# Patient Record
Sex: FEMALE | Race: AMERICAN INDIAN OR ALASKA NATIVE | HISPANIC OR LATINO | URBAN - METROPOLITAN AREA
[De-identification: names, ages, dates, MRNs, and addresses within clinical notes are randomized per-mention and may not be internally consistent; named-entity substitution may affect disease eponyms.]

---

## 2017-05-12 ENCOUNTER — EMERGENCY (EMERGENCY)
Facility: HOSPITAL | Age: 42
LOS: 1 days | Discharge: HOME | End: 2017-05-12
Attending: EMERGENCY MEDICINE
Payer: SELF-PAY

## 2017-05-12 VITALS
OXYGEN SATURATION: 100 % | HEART RATE: 75 BPM | WEIGHT: 154.98 LBS | HEIGHT: 61 IN | TEMPERATURE: 98 F | RESPIRATION RATE: 16 BRPM | DIASTOLIC BLOOD PRESSURE: 52 MMHG | SYSTOLIC BLOOD PRESSURE: 106 MMHG
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
## 2017-05-12 DIAGNOSIS — N60.01 SOLITARY CYST OF RIGHT BREAST: Chronic | ICD-10-CM

## 2017-05-12 DIAGNOSIS — E03.9 HYPOTHYROIDISM, UNSPECIFIED: ICD-10-CM
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
## 2017-05-12 DIAGNOSIS — S33.5XXA SPRAIN OF LIGAMENTS OF LUMBAR SPINE, INITIAL ENCOUNTER: ICD-10-CM

## 2017-05-12 PROCEDURE — 99053 MED SERV 10PM-8AM 24 HR FAC: CPT

## 2017-05-12 PROCEDURE — 99283 EMERGENCY DEPT VISIT LOW MDM: CPT

## 2017-05-12 PROCEDURE — 99283 EMERGENCY DEPT VISIT LOW MDM: CPT | Mod: 25

## 2017-05-12 RX ORDER — IBUPROFEN 200 MG
600 TABLET ORAL ONCE
Qty: 0 | Refills: 0 | Status: COMPLETED | OUTPATIENT
Start: 2017-05-12 | End: 2017-05-12

## 2017-05-12 RX ORDER — IBUPROFEN 200 MG
1 TABLET ORAL
Qty: 30 | Refills: 0
Start: 2017-05-12 | End: 2017-05-22

## 2017-05-12 RX ORDER — OXYCODONE HYDROCHLORIDE 5 MG/1
1 TABLET ORAL
Qty: 12 | Refills: 0
Start: 2017-05-12 | End: 2017-05-15

## 2017-05-12 RX ADMIN — Medication 600 MILLIGRAM(S): at 02:28

## 2017-05-12 NOTE — ED PROVIDER NOTE - OBJECTIVE STATEMENT
41 y/o F pt w/ PMHx of Hypothyroid presents to ED c/o R lower back pain x1 month. Pt points to R SI joint as the location of her pain. Pt states that her symptoms onset while lifting weights at the gym 1 month ago; pt went to her PMD 2 weeks ago and was given a muscle relaxer to no relief. Pt reports that her pain radiates down her leg; pt's pain is worsened w/ movement. Pt denies dysuria, hematuria, abd pain, or any other complaints. NKDA.

## 2017-05-12 NOTE — ED ADULT NURSE NOTE - OBJECTIVE STATEMENT
Pt co lower back pain, went to her pmd and her physical therapy but the pain is still persistent. denies trouble urination. possibly injured it when she was at the gym. denies fever and chills.  pain during ambulationg

## 2017-05-12 NOTE — ED PROVIDER NOTE - CRANIAL NERVE AND PUPILLARY EXAM
central and peripheral vision intact/extra-ocular movements intact/cranial nerves 2-12 intact/corneal reflex intact/tongue is midline

## 2017-05-12 NOTE — ED PROVIDER NOTE - NS ED MD SCRIBE ATTENDING SCRIBE SECTIONS
PAST MEDICAL/SURGICAL/SOCIAL HISTORY/REVIEW OF SYSTEMS/PHYSICAL EXAM/VITAL SIGNS( Pullset)/HISTORY OF PRESENT ILLNESS/DISPOSITION/HIV

## 2020-09-28 ENCOUNTER — RESULT REVIEW (OUTPATIENT)
Age: 45
End: 2020-09-28

## 2021-10-04 ENCOUNTER — APPOINTMENT (OUTPATIENT)
Dept: CARDIOLOGY | Facility: CLINIC | Age: 46
End: 2021-10-04

## 2022-01-31 ENCOUNTER — RESULT REVIEW (OUTPATIENT)
Age: 47
End: 2022-01-31

## 2022-02-23 ENCOUNTER — RESULT REVIEW (OUTPATIENT)
Age: 47
End: 2022-02-23

## 2022-03-01 ENCOUNTER — RESULT REVIEW (OUTPATIENT)
Age: 47
End: 2022-03-01

## 2022-03-01 ENCOUNTER — OUTPATIENT (OUTPATIENT)
Dept: OUTPATIENT SERVICES | Facility: HOSPITAL | Age: 47
LOS: 1 days | End: 2022-03-01
Payer: MEDICAID

## 2022-03-01 DIAGNOSIS — N63.0 UNSPECIFIED LUMP IN UNSPECIFIED BREAST: ICD-10-CM

## 2022-03-01 DIAGNOSIS — N60.01 SOLITARY CYST OF RIGHT BREAST: Chronic | ICD-10-CM

## 2022-03-01 LAB — SURGICAL PATHOLOGY STUDY: SIGNIFICANT CHANGE UP

## 2022-03-01 PROCEDURE — 88321 CONSLTJ&REPRT SLD PREP ELSWR: CPT

## 2022-03-06 NOTE — ED ADULT TRIAGE NOTE - RESPIRATORY RATE (BREATHS/MIN)
Problem: Pain:  Goal: Pain level will decrease  Description: Pain level will decrease  3/6/2022 1027 by Valdemar Lopez RN  Outcome: Completed  3/6/2022 0516 by Jossie Duarte RN  Outcome: Ongoing  Goal: Control of acute pain  Description: Control of acute pain  3/6/2022 1027 by Valdemar Lopez RN  Outcome: Completed  3/6/2022 0516 by Jossie Duarte RN  Outcome: Ongoing  Goal: Control of chronic pain  Description: Control of chronic pain  3/6/2022 1027 by Valdemar Lopez RN  Outcome: Completed  3/6/2022 0516 by Jossie Duarte RN  Outcome: Ongoing 16

## 2022-03-16 ENCOUNTER — TRANSCRIPTION ENCOUNTER (OUTPATIENT)
Age: 47
End: 2022-03-16

## 2022-03-17 ENCOUNTER — TRANSCRIPTION ENCOUNTER (OUTPATIENT)
Age: 47
End: 2022-03-17

## 2022-03-17 ENCOUNTER — INPATIENT (INPATIENT)
Facility: HOSPITAL | Age: 47
LOS: 2 days | Discharge: HOME | DRG: 581 | End: 2022-03-20
Attending: SURGERY | Admitting: SURGERY
Payer: MEDICAID

## 2022-03-17 ENCOUNTER — RESULT REVIEW (OUTPATIENT)
Age: 47
End: 2022-03-17

## 2022-03-17 VITALS
TEMPERATURE: 98 F | HEIGHT: 61 IN | SYSTOLIC BLOOD PRESSURE: 106 MMHG | DIASTOLIC BLOOD PRESSURE: 72 MMHG | HEART RATE: 81 BPM | RESPIRATION RATE: 16 BRPM | WEIGHT: 161.82 LBS | OXYGEN SATURATION: 98 %

## 2022-03-17 DIAGNOSIS — Z86.79 PERSONAL HISTORY OF OTHER DISEASES OF THE CIRCULATORY SYSTEM: Chronic | ICD-10-CM

## 2022-03-17 DIAGNOSIS — N60.01 SOLITARY CYST OF RIGHT BREAST: Chronic | ICD-10-CM

## 2022-03-17 PROCEDURE — 88302 TISSUE EXAM BY PATHOLOGIST: CPT | Mod: 26

## 2022-03-17 PROCEDURE — 88307 TISSUE EXAM BY PATHOLOGIST: CPT | Mod: 26

## 2022-03-17 RX ORDER — ACETAMINOPHEN 500 MG
1000 TABLET ORAL ONCE
Refills: 0 | Status: COMPLETED | OUTPATIENT
Start: 2022-03-17 | End: 2022-03-17

## 2022-03-17 RX ORDER — OXYCODONE HYDROCHLORIDE 5 MG/1
5 TABLET ORAL EVERY 6 HOURS
Refills: 0 | Status: DISCONTINUED | OUTPATIENT
Start: 2022-03-17 | End: 2022-03-17

## 2022-03-17 RX ORDER — HYDROMORPHONE HYDROCHLORIDE 2 MG/ML
1 INJECTION INTRAMUSCULAR; INTRAVENOUS; SUBCUTANEOUS
Refills: 0 | Status: DISCONTINUED | OUTPATIENT
Start: 2022-03-17 | End: 2022-03-17

## 2022-03-17 RX ORDER — KETOROLAC TROMETHAMINE 30 MG/ML
15 SYRINGE (ML) INJECTION EVERY 6 HOURS
Refills: 0 | Status: DISCONTINUED | OUTPATIENT
Start: 2022-03-17 | End: 2022-03-17

## 2022-03-17 RX ORDER — ACETAMINOPHEN 500 MG
650 TABLET ORAL EVERY 6 HOURS
Refills: 0 | Status: DISCONTINUED | OUTPATIENT
Start: 2022-03-17 | End: 2022-03-17

## 2022-03-17 RX ORDER — CEFAZOLIN SODIUM 1 G
2000 VIAL (EA) INJECTION EVERY 8 HOURS
Refills: 0 | Status: DISCONTINUED | OUTPATIENT
Start: 2022-03-17 | End: 2022-03-18

## 2022-03-17 RX ORDER — OXYCODONE HYDROCHLORIDE 5 MG/1
5 TABLET ORAL EVERY 6 HOURS
Refills: 0 | Status: DISCONTINUED | OUTPATIENT
Start: 2022-03-17 | End: 2022-03-18

## 2022-03-17 RX ORDER — METOCLOPRAMIDE HCL 10 MG
10 TABLET ORAL ONCE
Refills: 0 | Status: COMPLETED | OUTPATIENT
Start: 2022-03-17 | End: 2022-03-17

## 2022-03-17 RX ORDER — ACETAMINOPHEN 500 MG
1000 TABLET ORAL ONCE
Refills: 0 | Status: COMPLETED | OUTPATIENT
Start: 2022-03-18 | End: 2022-03-18

## 2022-03-17 RX ORDER — SODIUM CHLORIDE 9 MG/ML
1000 INJECTION, SOLUTION INTRAVENOUS
Refills: 0 | Status: DISCONTINUED | OUTPATIENT
Start: 2022-03-17 | End: 2022-03-18

## 2022-03-17 RX ORDER — ATORVASTATIN CALCIUM 80 MG/1
10 TABLET, FILM COATED ORAL AT BEDTIME
Refills: 0 | Status: DISCONTINUED | OUTPATIENT
Start: 2022-03-17 | End: 2022-03-18

## 2022-03-17 RX ORDER — ONDANSETRON 8 MG/1
4 TABLET, FILM COATED ORAL EVERY 6 HOURS
Refills: 0 | Status: DISCONTINUED | OUTPATIENT
Start: 2022-03-17 | End: 2022-03-18

## 2022-03-17 RX ORDER — ACETAMINOPHEN 500 MG
1000 TABLET ORAL ONCE
Refills: 0 | Status: DISCONTINUED | OUTPATIENT
Start: 2022-03-18 | End: 2022-03-18

## 2022-03-17 RX ORDER — HYDROMORPHONE HYDROCHLORIDE 2 MG/ML
0.5 INJECTION INTRAMUSCULAR; INTRAVENOUS; SUBCUTANEOUS
Refills: 0 | Status: DISCONTINUED | OUTPATIENT
Start: 2022-03-17 | End: 2022-03-18

## 2022-03-17 RX ORDER — LEVOTHYROXINE SODIUM 125 MCG
75 TABLET ORAL DAILY
Refills: 0 | Status: DISCONTINUED | OUTPATIENT
Start: 2022-03-18 | End: 2022-03-18

## 2022-03-17 RX ORDER — BUPIVACAINE 13.3 MG/ML
20 INJECTION, SUSPENSION, LIPOSOMAL INFILTRATION ONCE
Refills: 0 | Status: DISCONTINUED | OUTPATIENT
Start: 2022-03-17 | End: 2022-03-18

## 2022-03-17 RX ADMIN — HYDROMORPHONE HYDROCHLORIDE 0.5 MILLIGRAM(S): 2 INJECTION INTRAMUSCULAR; INTRAVENOUS; SUBCUTANEOUS at 18:09

## 2022-03-17 RX ADMIN — OXYCODONE HYDROCHLORIDE 5 MILLIGRAM(S): 5 TABLET ORAL at 22:06

## 2022-03-17 RX ADMIN — Medication 400 MILLIGRAM(S): at 22:48

## 2022-03-17 RX ADMIN — HYDROMORPHONE HYDROCHLORIDE 0.5 MILLIGRAM(S): 2 INJECTION INTRAMUSCULAR; INTRAVENOUS; SUBCUTANEOUS at 13:17

## 2022-03-17 RX ADMIN — ONDANSETRON 4 MILLIGRAM(S): 8 TABLET, FILM COATED ORAL at 14:14

## 2022-03-17 RX ADMIN — Medication 400 MILLIGRAM(S): at 16:28

## 2022-03-17 RX ADMIN — HYDROMORPHONE HYDROCHLORIDE 0.5 MILLIGRAM(S): 2 INJECTION INTRAMUSCULAR; INTRAVENOUS; SUBCUTANEOUS at 13:32

## 2022-03-17 RX ADMIN — Medication 1000 MILLIGRAM(S): at 22:56

## 2022-03-17 RX ADMIN — HYDROMORPHONE HYDROCHLORIDE 0.5 MILLIGRAM(S): 2 INJECTION INTRAMUSCULAR; INTRAVENOUS; SUBCUTANEOUS at 16:29

## 2022-03-17 RX ADMIN — ATORVASTATIN CALCIUM 10 MILLIGRAM(S): 80 TABLET, FILM COATED ORAL at 21:06

## 2022-03-17 RX ADMIN — Medication 5 MILLIGRAM(S): at 22:48

## 2022-03-17 RX ADMIN — Medication 100 MILLIGRAM(S): at 16:28

## 2022-03-17 RX ADMIN — Medication 10 MILLIGRAM(S): at 18:13

## 2022-03-17 RX ADMIN — OXYCODONE HYDROCHLORIDE 5 MILLIGRAM(S): 5 TABLET ORAL at 21:06

## 2022-03-17 RX ADMIN — ONDANSETRON 4 MILLIGRAM(S): 8 TABLET, FILM COATED ORAL at 21:07

## 2022-03-17 NOTE — PACU DISCHARGE NOTE - COMMENTS
[place] given to QUENTIN Mccord of [address]. Bilateral chest dressing intact on surgical bra. BERNICE x 2. Handover given to QUENTIN Mccord of [address]. Bilateral chest dressing intact on surgical bra. BERNICE x 2 on self suction. No complaints of pain. Went to tele floor in stable condition.

## 2022-03-17 NOTE — PRE-OP CHECKLIST - PATIENT'S PERSONAL PROPERTY GIVEN TO
2 bags on unit, valuables with security/on unit/security/safe 1 beg on unit, valuables with security/on unit/security/safe

## 2022-03-17 NOTE — PATIENT PROFILE ADULT - FALL HARM RISK - UNIVERSAL INTERVENTIONS
Bed in lowest position, wheels locked, appropriate side rails in place/Call bell, personal items and telephone in reach/Instruct patient to call for assistance before getting out of bed or chair/Non-slip footwear when patient is out of bed/[place] to call system/Physically safe environment - no spills, clutter or unnecessary equipment/Purposeful Proactive Rounding/Room/bathroom lighting operational, light cord in reach

## 2022-03-18 ENCOUNTER — RESULT REVIEW (OUTPATIENT)
Age: 47
End: 2022-03-18

## 2022-03-18 ENCOUNTER — TRANSCRIPTION ENCOUNTER (OUTPATIENT)
Age: 47
End: 2022-03-18

## 2022-03-18 LAB
ANION GAP SERPL CALC-SCNC: 8 MMOL/L — SIGNIFICANT CHANGE UP (ref 5–17)
BUN SERPL-MCNC: 12 MG/DL — SIGNIFICANT CHANGE UP (ref 7–23)
CALCIUM SERPL-MCNC: 8.5 MG/DL — SIGNIFICANT CHANGE UP (ref 8.4–10.5)
CHLORIDE SERPL-SCNC: 106 MMOL/L — SIGNIFICANT CHANGE UP (ref 96–108)
CO2 SERPL-SCNC: 24 MMOL/L — SIGNIFICANT CHANGE UP (ref 22–31)
CREAT SERPL-MCNC: 0.57 MG/DL — SIGNIFICANT CHANGE UP (ref 0.5–1.3)
EGFR: 114 ML/MIN/1.73M2 — SIGNIFICANT CHANGE UP
GLUCOSE SERPL-MCNC: 104 MG/DL — HIGH (ref 70–99)
HCT VFR BLD CALC: 35.1 % — SIGNIFICANT CHANGE UP (ref 34.5–45)
HGB BLD-MCNC: 11.1 G/DL — LOW (ref 11.5–15.5)
MAGNESIUM SERPL-MCNC: 2.1 MG/DL — SIGNIFICANT CHANGE UP (ref 1.6–2.6)
MCHC RBC-ENTMCNC: 27.4 PG — SIGNIFICANT CHANGE UP (ref 27–34)
MCHC RBC-ENTMCNC: 31.6 GM/DL — LOW (ref 32–36)
MCV RBC AUTO: 86.7 FL — SIGNIFICANT CHANGE UP (ref 80–100)
NRBC # BLD: 0 /100 WBCS — SIGNIFICANT CHANGE UP (ref 0–0)
PHOSPHATE SERPL-MCNC: 3.6 MG/DL — SIGNIFICANT CHANGE UP (ref 2.5–4.5)
PLATELET # BLD AUTO: 201 K/UL — SIGNIFICANT CHANGE UP (ref 150–400)
POTASSIUM SERPL-MCNC: 3.8 MMOL/L — SIGNIFICANT CHANGE UP (ref 3.5–5.3)
POTASSIUM SERPL-SCNC: 3.8 MMOL/L — SIGNIFICANT CHANGE UP (ref 3.5–5.3)
RBC # BLD: 4.05 M/UL — SIGNIFICANT CHANGE UP (ref 3.8–5.2)
RBC # FLD: 15.1 % — HIGH (ref 10.3–14.5)
SODIUM SERPL-SCNC: 138 MMOL/L — SIGNIFICANT CHANGE UP (ref 135–145)
WBC # BLD: 10.68 K/UL — HIGH (ref 3.8–10.5)
WBC # FLD AUTO: 10.68 K/UL — HIGH (ref 3.8–10.5)

## 2022-03-18 PROCEDURE — 88302 TISSUE EXAM BY PATHOLOGIST: CPT | Mod: 26

## 2022-03-18 DEVICE — IMPLANTABLE DEVICE: Type: IMPLANTABLE DEVICE | Status: FUNCTIONAL

## 2022-03-18 RX ORDER — ONDANSETRON 8 MG/1
4 TABLET, FILM COATED ORAL ONCE
Refills: 0 | Status: COMPLETED | OUTPATIENT
Start: 2022-03-18 | End: 2022-03-18

## 2022-03-18 RX ORDER — CEFAZOLIN SODIUM 1 G
2000 VIAL (EA) INJECTION ONCE
Refills: 0 | Status: COMPLETED | OUTPATIENT
Start: 2022-03-18 | End: 2022-03-18

## 2022-03-18 RX ORDER — ONDANSETRON 8 MG/1
4 TABLET, FILM COATED ORAL EVERY 6 HOURS
Refills: 0 | Status: DISCONTINUED | OUTPATIENT
Start: 2022-03-18 | End: 2022-03-20

## 2022-03-18 RX ORDER — BENZOCAINE AND MENTHOL 5; 1 G/100ML; G/100ML
1 LIQUID ORAL EVERY 4 HOURS
Refills: 0 | Status: DISCONTINUED | OUTPATIENT
Start: 2022-03-18 | End: 2022-03-20

## 2022-03-18 RX ORDER — CEFAZOLIN SODIUM 1 G
VIAL (EA) INJECTION
Refills: 0 | Status: COMPLETED | OUTPATIENT
Start: 2022-03-18 | End: 2022-03-19

## 2022-03-18 RX ORDER — HYDROMORPHONE HYDROCHLORIDE 2 MG/ML
0.5 INJECTION INTRAMUSCULAR; INTRAVENOUS; SUBCUTANEOUS EVERY 4 HOURS
Refills: 0 | Status: DISCONTINUED | OUTPATIENT
Start: 2022-03-18 | End: 2022-03-19

## 2022-03-18 RX ORDER — POTASSIUM CHLORIDE 20 MEQ
10 PACKET (EA) ORAL ONCE
Refills: 0 | Status: COMPLETED | OUTPATIENT
Start: 2022-03-18 | End: 2022-03-18

## 2022-03-18 RX ORDER — OXYCODONE HYDROCHLORIDE 5 MG/1
10 TABLET ORAL EVERY 4 HOURS
Refills: 0 | Status: DISCONTINUED | OUTPATIENT
Start: 2022-03-18 | End: 2022-03-19

## 2022-03-18 RX ORDER — HYDROMORPHONE HYDROCHLORIDE 2 MG/ML
0.25 INJECTION INTRAMUSCULAR; INTRAVENOUS; SUBCUTANEOUS ONCE
Refills: 0 | Status: DISCONTINUED | OUTPATIENT
Start: 2022-03-18 | End: 2022-03-18

## 2022-03-18 RX ORDER — SODIUM CHLORIDE 9 MG/ML
1000 INJECTION, SOLUTION INTRAVENOUS
Refills: 0 | Status: DISCONTINUED | OUTPATIENT
Start: 2022-03-18 | End: 2022-03-19

## 2022-03-18 RX ORDER — ATORVASTATIN CALCIUM 80 MG/1
10 TABLET, FILM COATED ORAL AT BEDTIME
Refills: 0 | Status: DISCONTINUED | OUTPATIENT
Start: 2022-03-18 | End: 2022-03-20

## 2022-03-18 RX ORDER — HYDROMORPHONE HYDROCHLORIDE 2 MG/ML
0.5 INJECTION INTRAMUSCULAR; INTRAVENOUS; SUBCUTANEOUS ONCE
Refills: 0 | Status: DISCONTINUED | OUTPATIENT
Start: 2022-03-18 | End: 2022-03-18

## 2022-03-18 RX ORDER — LEVOTHYROXINE SODIUM 125 MCG
75 TABLET ORAL DAILY
Refills: 0 | Status: DISCONTINUED | OUTPATIENT
Start: 2022-03-18 | End: 2022-03-20

## 2022-03-18 RX ORDER — CEFAZOLIN SODIUM 1 G
2000 VIAL (EA) INJECTION EVERY 8 HOURS
Refills: 0 | Status: COMPLETED | OUTPATIENT
Start: 2022-03-18 | End: 2022-03-19

## 2022-03-18 RX ORDER — OXYCODONE HYDROCHLORIDE 5 MG/1
5 TABLET ORAL EVERY 4 HOURS
Refills: 0 | Status: DISCONTINUED | OUTPATIENT
Start: 2022-03-18 | End: 2022-03-19

## 2022-03-18 RX ORDER — LIDOCAINE 4 G/100G
1 CREAM TOPICAL ONCE
Refills: 0 | Status: COMPLETED | OUTPATIENT
Start: 2022-03-18 | End: 2022-03-18

## 2022-03-18 RX ORDER — ACETAMINOPHEN 500 MG
650 TABLET ORAL EVERY 6 HOURS
Refills: 0 | Status: DISCONTINUED | OUTPATIENT
Start: 2022-03-18 | End: 2022-03-20

## 2022-03-18 RX ADMIN — ONDANSETRON 4 MILLIGRAM(S): 8 TABLET, FILM COATED ORAL at 16:55

## 2022-03-18 RX ADMIN — HYDROMORPHONE HYDROCHLORIDE 0.5 MILLIGRAM(S): 2 INJECTION INTRAMUSCULAR; INTRAVENOUS; SUBCUTANEOUS at 00:46

## 2022-03-18 RX ADMIN — LIDOCAINE 1 PATCH: 4 CREAM TOPICAL at 07:31

## 2022-03-18 RX ADMIN — LIDOCAINE 1 PATCH: 4 CREAM TOPICAL at 00:45

## 2022-03-18 RX ADMIN — Medication 1000 MILLIGRAM(S): at 12:00

## 2022-03-18 RX ADMIN — Medication 400 MILLIGRAM(S): at 11:06

## 2022-03-18 RX ADMIN — SODIUM CHLORIDE 90 MILLILITER(S): 9 INJECTION, SOLUTION INTRAVENOUS at 19:12

## 2022-03-18 RX ADMIN — Medication 1000 MILLIGRAM(S): at 05:50

## 2022-03-18 RX ADMIN — HYDROMORPHONE HYDROCHLORIDE 0.25 MILLIGRAM(S): 2 INJECTION INTRAMUSCULAR; INTRAVENOUS; SUBCUTANEOUS at 09:00

## 2022-03-18 RX ADMIN — HYDROMORPHONE HYDROCHLORIDE 0.25 MILLIGRAM(S): 2 INJECTION INTRAMUSCULAR; INTRAVENOUS; SUBCUTANEOUS at 09:04

## 2022-03-18 RX ADMIN — ONDANSETRON 4 MILLIGRAM(S): 8 TABLET, FILM COATED ORAL at 14:39

## 2022-03-18 RX ADMIN — OXYCODONE HYDROCHLORIDE 5 MILLIGRAM(S): 5 TABLET ORAL at 05:50

## 2022-03-18 RX ADMIN — HYDROMORPHONE HYDROCHLORIDE 0.5 MILLIGRAM(S): 2 INJECTION INTRAMUSCULAR; INTRAVENOUS; SUBCUTANEOUS at 18:46

## 2022-03-18 RX ADMIN — Medication 100 MILLIGRAM(S): at 00:18

## 2022-03-18 RX ADMIN — LIDOCAINE 1 PATCH: 4 CREAM TOPICAL at 16:07

## 2022-03-18 RX ADMIN — HYDROMORPHONE HYDROCHLORIDE 0.5 MILLIGRAM(S): 2 INJECTION INTRAMUSCULAR; INTRAVENOUS; SUBCUTANEOUS at 16:52

## 2022-03-18 RX ADMIN — SODIUM CHLORIDE 100 MILLILITER(S): 9 INJECTION, SOLUTION INTRAVENOUS at 07:33

## 2022-03-18 RX ADMIN — Medication 100 MILLIEQUIVALENT(S): at 11:06

## 2022-03-18 RX ADMIN — ATORVASTATIN CALCIUM 10 MILLIGRAM(S): 80 TABLET, FILM COATED ORAL at 22:29

## 2022-03-18 RX ADMIN — Medication 100 MILLIGRAM(S): at 22:29

## 2022-03-18 RX ADMIN — HYDROMORPHONE HYDROCHLORIDE 0.5 MILLIGRAM(S): 2 INJECTION INTRAMUSCULAR; INTRAVENOUS; SUBCUTANEOUS at 01:00

## 2022-03-18 RX ADMIN — Medication 100 MILLIGRAM(S): at 09:03

## 2022-03-18 RX ADMIN — Medication 75 MICROGRAM(S): at 05:50

## 2022-03-18 RX ADMIN — Medication 400 MILLIGRAM(S): at 05:49

## 2022-03-18 RX ADMIN — OXYCODONE HYDROCHLORIDE 5 MILLIGRAM(S): 5 TABLET ORAL at 06:50

## 2022-03-18 NOTE — ASU DISCHARGE PLAN (ADULT/PEDIATRIC) - FREQUENT HAND WASHING PREVENTS THE SPREAD OF INFECTION.
Hi all,      Please call patient's daughter Geneva to coordinate a radiology and virtual or in clinic follow up with Dr. Epi Aparicio to discuss the results. She states that the patient has a lab appointment on 1/12/2021. She would like to know if the radiology appointment could also be on that day as well. If you have any scheduling questions -please reach out to Loyda Lares MA-clinic assistant for Dr. Aparicio.       Thanks, Macy Johnson MA    Statement Selected

## 2022-03-18 NOTE — ASU DISCHARGE PLAN (ADULT/PEDIATRIC) - NO HEAVY LIFTING DURATION
Pleasse avoid lifting weights over 5-10 lbs, strenuous activities or heavy exercises for 4-6 weeks or until cleared by your plastic surgeon Please avoid lifting weights over 5-10 lbs, strenuous activities or heavy exercises for 4-6 weeks or until cleared by your plastic surgeon

## 2022-03-18 NOTE — BRIEF OPERATIVE NOTE - OPERATION/FINDINGS
R breast explored via previous mastectomy incision, alloderm opened, and silicone implant removed. No hematoma appreciated, no raw surface oozing or bleeding noted. Cavity copiously irrigated w NS, followed by triple antibiotic saline. Hemostasis achieved, alloderm closed in running fashion w 2-0 PDS x2, layered closure of dermis + epidermis w running 2-0 quill sutures. BERNICE drain replaced.
After general anesthesia, patient was prep'ed and draped under sterile fashion. 10cm circum-areolar elliptical incisions were made over left breast. Skin flaps were made by dissecting subcutaneous tissues. Breast tissue was dissected and excised including nipple, using electrocautery and blunt dissection with margins to clavicle superiorly, rectus sheath inferiorly, lattisimus dorsi laterally, sternum medially, and pectoralis major posteriorly. Hemostasis was verified after multiple times of irrigation. Same procedure was performed for mastectomy of the right side. Left sentinel lymph nodes were also dissected and sent for pathology. Surgery was proceeded by Plastic Surgery team for bilateral reconstruction with Alloderm and tissue expanders and placement of BERNICE drains (details in separate note by Plastic Surgery).

## 2022-03-18 NOTE — ASU DISCHARGE PLAN (ADULT/PEDIATRIC) - PROVIDER TOKENS
PROVIDER:[TOKEN:[[id number]],FOLLOWUP:[1 week]],PROVIDER:[TOKEN:[[id number]],SCHEDULEDAPPT:[03/21/2022]]

## 2022-03-18 NOTE — ASU DISCHARGE PLAN (ADULT/PEDIATRIC) - NS MD DC FALL RISK RISK
For information on Fall & Injury Prevention, visit: [email or web address] OR  [email or web address] OR  https://www.cdc.gov/steadi/patient.html

## 2022-03-18 NOTE — ASU DISCHARGE PLAN (ADULT/PEDIATRIC) - CARE PROVIDER_API CALL
Martin Onofre (DO)  Surgery  [address], [place] [address]  Phone: [phone]  Fax: [phone]  Follow Up Time: 1 week    Antoinette Arce)  Plastic Surgery  [address], [address]  [place] [address]  Phone: [phone]  Fax: [phone]  Scheduled Appointment: 03/21/2022

## 2022-03-18 NOTE — ASU DISCHARGE PLAN (ADULT/PEDIATRIC) - PROCEDURE
Bilateral mastectomy with left sentinel lymph node excision with bilateral reconstruction with tissue expanders

## 2022-03-18 NOTE — H&P PST ADULT - HISTORY OF PRESENT ILLNESS
46yo F PMH HLD & hypothyroidism, unclear cardiac hx (s/p stress test wnl), and L breast IDC and PSH C/S(x3) presents for b/l simple mastectomy and L SLND.

## 2022-03-18 NOTE — ASU DISCHARGE PLAN (ADULT/PEDIATRIC) - ASU DC SPECIAL INSTRUCTIONSFT
Please follow up with Dr Onofre or Dr. Santiago in one week. Please follow up with Dr. Arce on Monday 3/21/2022; you may call their offices to make an appointment at your earliest convenience.    Please take 2 tablets of extra-strength Tylenol (500mg) every 8 hours plus 2 tablets of Advil (200mg) every 6 hours, for pain control. Do NOT exceed 4,000mg of Tylenol per 24 hours. Do NOT take Advil on an empty stomach.    You have been prescribed oral antibiotic. Please be sure to take it as directed and complete the entire course of treatment.    Please hold aspirin for 2 weeks after your surgery and restart taking it 3/31. Please restart taking your other home medications including Synthroid, atorvastatin, and Myrbetriq today (3/18).      You are being discharged with 2 BERNICE drains. These will be re-examined and may be removed at time of your follow up visit with Dr. Arce. Please follow the below instructions for drain care:  *Please look at the site every day for signs of infection (increased redness or pain, swelling, odor, yellow or bloody discharge, warm to touch, fever).  *Maintain suction of the bulb.  *Note color, consistency, and amount of fluid in the drain. Call the doctor, nurse practitioner, or VNA nurse if the amount increases significantly or changes in character.  *Be sure to empty the drain frequently. Record the output, if instructed to do so.  *You may shower; wash the area gently with warm, soapy water.  *Keep the insertion site clean and dry otherwise.  *Avoid swimming, baths, hot tubs; do not submerge yourself in water.  *Make sure to keep the drain attached securely to your body to prevent pulling or dislocation.    General Discharge Instructions:  Please resume all regular home medications unless specifically advised not to take a particular medication. Also, please take any new medications as prescribed.  Please get plenty of rest, continue to ambulate several times per day, and drink adequate amounts of fluids. Avoid lifting weights greater than 5-10 lbs until you follow-up with your surgeon, who will instruct you further regarding activity restrictions.  Avoid driving or operating heavy machinery while taking pain medications.  Please follow-up with your surgeon and Primary Care Provider (PCP) as advised.  Incision Care:  *Please call your doctor or nurse practitioner if you have increased pain, swelling, redness, or drainage from the incision site.  *Avoid swimming and baths until your follow-up appointment.  *You may remove dressing Friday 3/18 (but do not remove Steri-strips and keep those in place until follow up visit) and start taking showers.   Please keep using bras until follow up visit. Please follow up with Dr Onofre or Dr. aSntiago in one week. Please follow up with Dr. Arce on Monday 3/21/2022; you may call their offices to make an appointment at your earliest convenience.    Please take 2 tablets of extra-strength Tylenol (500mg) every 8 hours plus 2 tablets of Advil (200mg) every 6 hours, for pain control. Do NOT exceed 4,000mg of Tylenol per 24 hours. Do NOT take Advil on an empty stomach.    You have been prescribed oral antibiotic. Please be sure to take it as directed and complete the entire course of treatment.    Please hold aspirin for 2 weeks after your surgery and restart taking it 3/31. Please restart taking your other home medications including Synthroid, atorvastatin, and Myrbetriq today (3/18).      You are being discharged with 2 BERNICE drains. These will be re-examined and may be removed at time of your follow up visit with Dr. Arce. Please follow the below instructions for drain care:  *Please look at the site every day for signs of infection (increased redness or pain, swelling, odor, yellow or bloody discharge, warm to touch, fever).  *Maintain suction of the bulb.  *Note color, consistency, and amount of fluid in the drain. Call the doctor, nurse practitioner, or VNA nurse if the amount increases significantly or changes in character.  *Be sure to empty the drain frequently. Record the daily output (in milliliters), as instructed.  *You may shower; wash the area gently with warm, soapy water.  *Keep the insertion site clean and dry otherwise.  *Avoid swimming, baths, hot tubs; do not submerge yourself in water.  *Make sure to keep the drain attached securely to your body to prevent pulling or dislocation.    General Discharge Instructions:  Please resume all regular home medications unless specifically advised not to take a particular medication. Also, please take any new medications as prescribed.  Please get plenty of rest, continue to ambulate several times per day, and drink adequate amounts of fluids. Avoid lifting weights greater than 5-10 lbs until you follow-up with your surgeon, who will instruct you further regarding activity restrictions.  Avoid driving or operating heavy machinery while taking pain medications.  Please follow-up with your surgeon and Primary Care Provider (PCP) as advised.  Incision Care:  *Please call your doctor or nurse practitioner if you have increased pain, swelling, redness, or drainage from the incision site.  *Avoid swimming and baths until your follow-up appointment.  *You may remove dressing Friday 3/18 (but do not remove Steri-strips and keep those in place until follow up visit) and start taking showers.   Please keep using bras until follow up visit. Please follow up with Dr Onofre or Dr. Santiago in one week. Please follow up with Dr. Arce on Monday 3/21/2022; you may call their offices to make an appointment at your earliest convenience.    Please take 2 tablets of extra-strength Tylenol (500mg) every 8 hours plus 2 tablets of Advil (200mg) every 6 hours, for pain control. Do NOT exceed 4,000mg of Tylenol per 24 hours. Do NOT take Advil on an empty stomach.    You have been prescribed oral antibiotic. Please be sure to take it as directed and complete the entire course of treatment.    Please hold aspirin for 2 weeks after your surgery and restart taking it 3/31. Please restart taking your other home medications including Synthroid, atorvastatin, and Myrbetriq today (3/20).      You are being discharged with 2 BERNICE drains. These will be re-examined and may be removed at time of your follow up visit with Dr. Arce. Please follow the below instructions for drain care:  *Please look at the site every day for signs of infection (increased redness or pain, swelling, odor, yellow or bloody discharge, warm to touch, fever).  *Maintain suction of the bulb.  *Note color, consistency, and amount of fluid in the drain. Call the doctor, nurse practitioner, or VNA nurse if the amount increases significantly or changes in character.  *Be sure to empty the drain frequently. Record the daily output (in milliliters), as instructed.  *You may shower; wash the area gently with warm, soapy water.  *Keep the insertion site clean and dry otherwise.  *Avoid swimming, baths, hot tubs; do not submerge yourself in water.  *Make sure to keep the drain attached securely to your body to prevent pulling or dislocation.    General Discharge Instructions:  Please resume all regular home medications unless specifically advised not to take a particular medication. Also, please take any new medications as prescribed.  Please get plenty of rest, continue to ambulate several times per day, and drink adequate amounts of fluids. Avoid lifting weights greater than 5-10 lbs until you follow-up with your surgeon, who will instruct you further regarding activity restrictions.  Avoid driving or operating heavy machinery while taking pain medications.  Please follow-up with your surgeon and Primary Care Provider (PCP) as advised.  Incision Care:  *Please call your doctor or nurse practitioner if you have increased pain, swelling, redness, or drainage from the incision site.  *Avoid swimming and baths until your follow-up appointment.  *You may remove dressing Friday 3/18 (but do not remove Steri-strips and keep those in place until follow up visit) and start taking showers.   Please keep using bras until follow up visit.

## 2022-03-18 NOTE — BRIEF OPERATIVE NOTE - NSICDXBRIEFPREOP_GEN_ALL_CORE_FT
PRE-OP DIAGNOSIS:  Invasive ductal carcinoma of left breast 17-Mar-2022 14:52:58  Eliud Sharma  
PRE-OP DIAGNOSIS:  Hematoma of right breast 18-Mar-2022 14:29:26  Mook Mullins

## 2022-03-18 NOTE — H&P PST ADULT - ASSESSMENT
48yo F PMH HLD & hypothyroidism, unclear cardia hx, and L breast IDC and PSH C/S(x3) presents for b/l simple mastectomy and L SLND s/p RTOR for right breast exploration, no bleeding or hematoma noted, right breast irrigated and new implant placed (3/18)    - Regular  - IVF (LR@100cc/hr)  - IVAB (2gr Ancef q8h x1day) to c/w PO (Keflex 500mg q6h)   - Pain control standing (Tylenol) and prn (oxycodone)  - Nausea control prn  - SCDs  - OOBA/IS  - AM labs  - JPs (x2)

## 2022-03-18 NOTE — BRIEF OPERATIVE NOTE - NSICDXBRIEFPOSTOP_GEN_ALL_CORE_FT
POST-OP DIAGNOSIS:  Invasive ductal carcinoma of left breast 17-Mar-2022 14:53:38  Eliud Sharma  
POST-OP DIAGNOSIS:  Invasive ductal carcinoma of left breast 17-Mar-2022 14:53:38  Eliud Sharma

## 2022-03-18 NOTE — PRE-OP CHECKLIST - AS BP NONINV SITE
----- Message from Jailyn Perales sent at 2022  8:39 AM CDT -----  Contact: mom Manasa Quan   Mom would jag a call back with questions about the formula     
Spoke with mom, she states that she went to the [facility] office today and the paperwork is filled out incorrectly. Pt needs the concentrate and not the powder form. Mom will drop form off to the office for completion.   
left upper arm
right upper arm

## 2022-03-18 NOTE — BRIEF OPERATIVE NOTE - SPECIMENS
R breast implant
Left and right breast tissues, left sentinel lymph node, left and right mastectomy flaps

## 2022-03-18 NOTE — BRIEF OPERATIVE NOTE - NSICDXBRIEFPROCEDURE_GEN_ALL_CORE_FT
PROCEDURES:  Exploration of right breast 18-Mar-2022 14:29:10  Mook Mullins  
PROCEDURES:  Bilateral skin sparing mastectomy with unilateral sentinel lymph node biopsy and axillary lymphadenectomy 17-Mar-2022 14:50:44  Eliud Sharma  Bilateral mastectomy with insertion of tissue expander on both sides 17-Mar-2022 14:52:26  Eliud Sharma

## 2022-03-18 NOTE — PRE-OP CHECKLIST - SELECT TESTS ORDERED
urine c&s; US/BMP/CBC/PT/PTT/INR/Urinalysis/EKG/COVID-19
BMP/PT/PTT/INR/Type and Cross/Type and Screen/Urinalysis/HCG/EKG/CXR/COVID-19

## 2022-03-18 NOTE — ASU DISCHARGE PLAN (ADULT/PEDIATRIC) - MEDICATION INSTRUCTIONS
Please take 2 tablets of extra-strength Tylenol (500mg) every 8 hours plus 2 tablets of Advil (200mg) every 6 hours, for pain control. Do NOT exceed 4,000mg of Tylenol per 24 hours. Do NOT take Advil on an empty stomach.

## 2022-03-19 LAB
ANION GAP SERPL CALC-SCNC: 12 MMOL/L — SIGNIFICANT CHANGE UP (ref 5–17)
BUN SERPL-MCNC: 15 MG/DL — SIGNIFICANT CHANGE UP (ref 7–23)
CALCIUM SERPL-MCNC: 8.2 MG/DL — LOW (ref 8.4–10.5)
CHLORIDE SERPL-SCNC: 104 MMOL/L — SIGNIFICANT CHANGE UP (ref 96–108)
CO2 SERPL-SCNC: 23 MMOL/L — SIGNIFICANT CHANGE UP (ref 22–31)
CREAT SERPL-MCNC: 0.67 MG/DL — SIGNIFICANT CHANGE UP (ref 0.5–1.3)
EGFR: 110 ML/MIN/1.73M2 — SIGNIFICANT CHANGE UP
GLUCOSE SERPL-MCNC: 97 MG/DL — SIGNIFICANT CHANGE UP (ref 70–99)
HCT VFR BLD CALC: 34.3 % — LOW (ref 34.5–45)
HGB BLD-MCNC: 11 G/DL — LOW (ref 11.5–15.5)
MAGNESIUM SERPL-MCNC: 1.8 MG/DL — SIGNIFICANT CHANGE UP (ref 1.6–2.6)
MCHC RBC-ENTMCNC: 28.2 PG — SIGNIFICANT CHANGE UP (ref 27–34)
MCHC RBC-ENTMCNC: 32.1 GM/DL — SIGNIFICANT CHANGE UP (ref 32–36)
MCV RBC AUTO: 87.9 FL — SIGNIFICANT CHANGE UP (ref 80–100)
NRBC # BLD: 0 /100 WBCS — SIGNIFICANT CHANGE UP (ref 0–0)
PHOSPHATE SERPL-MCNC: 3 MG/DL — SIGNIFICANT CHANGE UP (ref 2.5–4.5)
PLATELET # BLD AUTO: 186 K/UL — SIGNIFICANT CHANGE UP (ref 150–400)
POTASSIUM SERPL-MCNC: 3.9 MMOL/L — SIGNIFICANT CHANGE UP (ref 3.5–5.3)
POTASSIUM SERPL-SCNC: 3.9 MMOL/L — SIGNIFICANT CHANGE UP (ref 3.5–5.3)
RBC # BLD: 3.9 M/UL — SIGNIFICANT CHANGE UP (ref 3.8–5.2)
RBC # FLD: 15.5 % — HIGH (ref 10.3–14.5)
SODIUM SERPL-SCNC: 139 MMOL/L — SIGNIFICANT CHANGE UP (ref 135–145)
WBC # BLD: 10.59 K/UL — HIGH (ref 3.8–10.5)
WBC # FLD AUTO: 10.59 K/UL — HIGH (ref 3.8–10.5)

## 2022-03-19 RX ORDER — TRAMADOL HYDROCHLORIDE 50 MG/1
50 TABLET ORAL EVERY 6 HOURS
Refills: 0 | Status: DISCONTINUED | OUTPATIENT
Start: 2022-03-19 | End: 2022-03-20

## 2022-03-19 RX ORDER — CEPHALEXIN 500 MG
500 CAPSULE ORAL EVERY 6 HOURS
Refills: 0 | Status: DISCONTINUED | OUTPATIENT
Start: 2022-03-19 | End: 2022-03-20

## 2022-03-19 RX ORDER — IBUPROFEN 200 MG
400 TABLET ORAL EVERY 8 HOURS
Refills: 0 | Status: DISCONTINUED | OUTPATIENT
Start: 2022-03-19 | End: 2022-03-20

## 2022-03-19 RX ORDER — MAGNESIUM SULFATE 500 MG/ML
1 VIAL (ML) INJECTION ONCE
Refills: 0 | Status: COMPLETED | OUTPATIENT
Start: 2022-03-19 | End: 2022-03-19

## 2022-03-19 RX ADMIN — Medication 650 MILLIGRAM(S): at 05:01

## 2022-03-19 RX ADMIN — Medication 100 MILLIGRAM(S): at 14:03

## 2022-03-19 RX ADMIN — OXYCODONE HYDROCHLORIDE 10 MILLIGRAM(S): 5 TABLET ORAL at 06:00

## 2022-03-19 RX ADMIN — Medication 400 MILLIGRAM(S): at 22:46

## 2022-03-19 RX ADMIN — BENZOCAINE AND MENTHOL 1 LOZENGE: 5; 1 LIQUID ORAL at 05:06

## 2022-03-19 RX ADMIN — Medication 650 MILLIGRAM(S): at 14:02

## 2022-03-19 RX ADMIN — Medication 75 MICROGRAM(S): at 05:02

## 2022-03-19 RX ADMIN — ONDANSETRON 4 MILLIGRAM(S): 8 TABLET, FILM COATED ORAL at 06:35

## 2022-03-19 RX ADMIN — Medication 650 MILLIGRAM(S): at 01:10

## 2022-03-19 RX ADMIN — BENZOCAINE AND MENTHOL 1 LOZENGE: 5; 1 LIQUID ORAL at 01:07

## 2022-03-19 RX ADMIN — OXYCODONE HYDROCHLORIDE 5 MILLIGRAM(S): 5 TABLET ORAL at 18:53

## 2022-03-19 RX ADMIN — OXYCODONE HYDROCHLORIDE 10 MILLIGRAM(S): 5 TABLET ORAL at 11:00

## 2022-03-19 RX ADMIN — Medication 400 MILLIGRAM(S): at 21:51

## 2022-03-19 RX ADMIN — Medication 650 MILLIGRAM(S): at 06:00

## 2022-03-19 RX ADMIN — Medication 100 GRAM(S): at 10:13

## 2022-03-19 RX ADMIN — OXYCODONE HYDROCHLORIDE 10 MILLIGRAM(S): 5 TABLET ORAL at 01:10

## 2022-03-19 RX ADMIN — Medication 5 MILLIGRAM(S): at 05:05

## 2022-03-19 RX ADMIN — ONDANSETRON 4 MILLIGRAM(S): 8 TABLET, FILM COATED ORAL at 14:43

## 2022-03-19 RX ADMIN — Medication 650 MILLIGRAM(S): at 19:12

## 2022-03-19 RX ADMIN — ATORVASTATIN CALCIUM 10 MILLIGRAM(S): 80 TABLET, FILM COATED ORAL at 21:51

## 2022-03-19 RX ADMIN — Medication 650 MILLIGRAM(S): at 23:45

## 2022-03-19 RX ADMIN — Medication 100 MILLIGRAM(S): at 05:02

## 2022-03-19 RX ADMIN — Medication 650 MILLIGRAM(S): at 00:16

## 2022-03-19 RX ADMIN — OXYCODONE HYDROCHLORIDE 5 MILLIGRAM(S): 5 TABLET ORAL at 17:19

## 2022-03-19 RX ADMIN — OXYCODONE HYDROCHLORIDE 10 MILLIGRAM(S): 5 TABLET ORAL at 10:29

## 2022-03-19 RX ADMIN — OXYCODONE HYDROCHLORIDE 10 MILLIGRAM(S): 5 TABLET ORAL at 05:01

## 2022-03-19 RX ADMIN — Medication 650 MILLIGRAM(S): at 18:46

## 2022-03-19 RX ADMIN — Medication 500 MILLIGRAM(S): at 23:45

## 2022-03-19 RX ADMIN — Medication 650 MILLIGRAM(S): at 14:20

## 2022-03-19 RX ADMIN — OXYCODONE HYDROCHLORIDE 10 MILLIGRAM(S): 5 TABLET ORAL at 00:16

## 2022-03-19 NOTE — PHYSICAL THERAPY INITIAL EVALUATION ADULT - GAIT DEVIATIONS NOTED, PT EVAL
fairly steady gait, no LOB/knee buckling noted; *increased time required with turning/decreased neville/decreased velocity of limb motion/decreased step length/decreased weight-shifting ability

## 2022-03-19 NOTE — PHYSICAL THERAPY INITIAL EVALUATION ADULT - DISCHARGE DISPOSITION, PT EVAL
Home, no PT needs **pending progress (family assist available 24/7 as needed as per daughter Lois; educated daughter on Outpatient PT follow-up once medically cleared by surgeon) Home, no PT needs **pending progress (family assist available 24/7 as needed as per daughter Lois; educated daughter on Outpatient PT follow-up once patient is medically cleared by surgeon)

## 2022-03-19 NOTE — PHYSICAL THERAPY INITIAL EVALUATION ADULT - PHYSICAL ASSIST/NONPHYSICAL ASSIST: SIT/STAND, REHAB EVAL
slightly unsteady however no LOB/knee buckling noted/verbal cues/nonverbal cues (demo/gestures)/1 person assist

## 2022-03-19 NOTE — PHYSICAL THERAPY INITIAL EVALUATION ADULT - ADDITIONAL COMMENTS
Patient was previously independent with all ADLs/IADLs prior to admission. No HHA. Denies history of mechanical falls prior to admission. Daughter (Lois), at bedside, endorses patient will have total assist available at home as needed upon discharge from [facility].

## 2022-03-19 NOTE — PHYSICAL THERAPY INITIAL EVALUATION ADULT - GAIT DISTANCE, PT EVAL
~10 feet x 1 only with therapist 2/2 patient reports "increased dizziness, nausea, and hot flashes); daughter endorses patient had "just walked up and down the hallway about 2 times"; **patient assisted back to room with chair

## 2022-03-19 NOTE — PHYSICAL THERAPY INITIAL EVALUATION ADULT - GENERAL OBSERVATIONS, REHAB EVAL
PT IE completed. Chart reviewed. Patient without complaints of pain, agreeable to continue ambulating with PT in hallway. Patient received ambulating in hallway with daughter (Lois) and cousin, NAD, +surgical bra donned, +2 JPs intact, +IV hep lock, QUENTIN Arthur cleared patient for treatment session.

## 2022-03-19 NOTE — PHYSICAL THERAPY INITIAL EVALUATION ADULT - PERTINENT HX OF CURRENT PROBLEM, REHAB EVAL
48yo F PMH HLD & hypothyroidism, unclear cardiac hx (s/p stress test wnl), and L breast IDC and PSH C/S(x3) presents for b/l simple mastectomy and L SLND. Please refer to H&P on [place] for remaining.

## 2022-03-19 NOTE — PHYSICAL THERAPY INITIAL EVALUATION ADULT - ACTIVE RANGE OF MOTION EXAMINATION, REHAB EVAL
Bilateral shoulder flexion/abduction greater than or equal to 90 degrees not tested secondary to bilateral mastectomy precautions/bilateral upper extremity Active ROM was WFL (within functional limits)/bilateral  lower extremity Active ROM was WFL (within functional limits)

## 2022-03-19 NOTE — PHYSICAL THERAPY INITIAL EVALUATION ADULT - PRECAUTIONS/LIMITATIONS, REHAB EVAL
Reviewed/educated patient on bilateral mastectomy precautions; patient verbalized understanding./surgical precautions

## 2022-03-19 NOTE — PHYSICAL THERAPY INITIAL EVALUATION ADULT - THERAPY FREQUENCY, PT EVAL
Patient and daughter educated on frequency of inpatient physical therapy at [facility], both verbalized understanding./3-5x/week

## 2022-03-20 ENCOUNTER — TRANSCRIPTION ENCOUNTER (OUTPATIENT)
Age: 47
End: 2022-03-20

## 2022-03-20 VITALS
SYSTOLIC BLOOD PRESSURE: 115 MMHG | HEART RATE: 86 BPM | OXYGEN SATURATION: 96 % | DIASTOLIC BLOOD PRESSURE: 57 MMHG | RESPIRATION RATE: 18 BRPM

## 2022-03-20 LAB
ANION GAP SERPL CALC-SCNC: 10 MMOL/L — SIGNIFICANT CHANGE UP (ref 5–17)
BUN SERPL-MCNC: 13 MG/DL — SIGNIFICANT CHANGE UP (ref 7–23)
CALCIUM SERPL-MCNC: 8.4 MG/DL — SIGNIFICANT CHANGE UP (ref 8.4–10.5)
CHLORIDE SERPL-SCNC: 105 MMOL/L — SIGNIFICANT CHANGE UP (ref 96–108)
CO2 SERPL-SCNC: 24 MMOL/L — SIGNIFICANT CHANGE UP (ref 22–31)
CREAT SERPL-MCNC: 0.59 MG/DL — SIGNIFICANT CHANGE UP (ref 0.5–1.3)
EGFR: 113 ML/MIN/1.73M2 — SIGNIFICANT CHANGE UP
GLUCOSE SERPL-MCNC: 89 MG/DL — SIGNIFICANT CHANGE UP (ref 70–99)
HCT VFR BLD CALC: 35.5 % — SIGNIFICANT CHANGE UP (ref 34.5–45)
HGB BLD-MCNC: 11.3 G/DL — LOW (ref 11.5–15.5)
MAGNESIUM SERPL-MCNC: 2.1 MG/DL — SIGNIFICANT CHANGE UP (ref 1.6–2.6)
MCHC RBC-ENTMCNC: 28.3 PG — SIGNIFICANT CHANGE UP (ref 27–34)
MCHC RBC-ENTMCNC: 31.8 GM/DL — LOW (ref 32–36)
MCV RBC AUTO: 89 FL — SIGNIFICANT CHANGE UP (ref 80–100)
NRBC # BLD: 0 /100 WBCS — SIGNIFICANT CHANGE UP (ref 0–0)
PHOSPHATE SERPL-MCNC: 3.6 MG/DL — SIGNIFICANT CHANGE UP (ref 2.5–4.5)
PLATELET # BLD AUTO: 188 K/UL — SIGNIFICANT CHANGE UP (ref 150–400)
POTASSIUM SERPL-MCNC: 4 MMOL/L — SIGNIFICANT CHANGE UP (ref 3.5–5.3)
POTASSIUM SERPL-SCNC: 4 MMOL/L — SIGNIFICANT CHANGE UP (ref 3.5–5.3)
RBC # BLD: 3.99 M/UL — SIGNIFICANT CHANGE UP (ref 3.8–5.2)
RBC # FLD: 15.6 % — HIGH (ref 10.3–14.5)
SODIUM SERPL-SCNC: 139 MMOL/L — SIGNIFICANT CHANGE UP (ref 135–145)
WBC # BLD: 8.09 K/UL — SIGNIFICANT CHANGE UP (ref 3.8–10.5)
WBC # FLD AUTO: 8.09 K/UL — SIGNIFICANT CHANGE UP (ref 3.8–10.5)

## 2022-03-20 PROCEDURE — 97116 GAIT TRAINING THERAPY: CPT

## 2022-03-20 PROCEDURE — 78195 LYMPH SYSTEM IMAGING: CPT

## 2022-03-20 PROCEDURE — 88307 TISSUE EXAM BY PATHOLOGIST: CPT

## 2022-03-20 PROCEDURE — 85027 COMPLETE CBC AUTOMATED: CPT

## 2022-03-20 PROCEDURE — 97161 PT EVAL LOW COMPLEX 20 MIN: CPT

## 2022-03-20 PROCEDURE — A9541: CPT

## 2022-03-20 PROCEDURE — 88302 TISSUE EXAM BY PATHOLOGIST: CPT

## 2022-03-20 PROCEDURE — 83735 ASSAY OF MAGNESIUM: CPT

## 2022-03-20 PROCEDURE — 84100 ASSAY OF PHOSPHORUS: CPT

## 2022-03-20 PROCEDURE — C1789: CPT

## 2022-03-20 PROCEDURE — 80048 BASIC METABOLIC PNL TOTAL CA: CPT

## 2022-03-20 PROCEDURE — 36415 COLL VENOUS BLD VENIPUNCTURE: CPT

## 2022-03-20 PROCEDURE — 97530 THERAPEUTIC ACTIVITIES: CPT

## 2022-03-20 RX ORDER — IBUPROFEN 200 MG
2 TABLET ORAL
Qty: 84 | Refills: 0
Start: 2022-03-20 | End: 2022-04-02

## 2022-03-20 RX ORDER — CEPHALEXIN 500 MG
1 CAPSULE ORAL
Qty: 0 | Refills: 0 | DISCHARGE
Start: 2022-03-20

## 2022-03-20 RX ORDER — ACETAMINOPHEN 500 MG
2 TABLET ORAL
Qty: 112 | Refills: 0
Start: 2022-03-20 | End: 2022-04-02

## 2022-03-20 RX ORDER — POLYETHYLENE GLYCOL 3350 17 G/17G
17 POWDER, FOR SOLUTION ORAL ONCE
Refills: 0 | Status: COMPLETED | OUTPATIENT
Start: 2022-03-20 | End: 2022-03-20

## 2022-03-20 RX ORDER — TRAMADOL HYDROCHLORIDE 50 MG/1
1 TABLET ORAL
Qty: 80 | Refills: 0
Start: 2022-03-20 | End: 2022-04-08

## 2022-03-20 RX ORDER — ACETAMINOPHEN 500 MG
2 TABLET ORAL
Qty: 0 | Refills: 0 | DISCHARGE

## 2022-03-20 RX ORDER — ATORVASTATIN CALCIUM 80 MG/1
1 TABLET, FILM COATED ORAL
Qty: 0 | Refills: 0 | DISCHARGE
Start: 2022-03-20

## 2022-03-20 RX ORDER — CEPHALEXIN 500 MG
1 CAPSULE ORAL
Qty: 42 | Refills: 0
Start: 2022-03-20 | End: 2022-04-02

## 2022-03-20 RX ORDER — LEVOTHYROXINE SODIUM 125 MCG
1 TABLET ORAL
Qty: 0 | Refills: 0 | DISCHARGE
Start: 2022-03-20

## 2022-03-20 RX ORDER — IBUPROFEN 200 MG
2 TABLET ORAL
Qty: 0 | Refills: 0 | DISCHARGE

## 2022-03-20 RX ORDER — CEPHALEXIN 500 MG
1 CAPSULE ORAL
Qty: 0 | Refills: 0 | DISCHARGE

## 2022-03-20 RX ADMIN — Medication 650 MILLIGRAM(S): at 11:08

## 2022-03-20 RX ADMIN — Medication 650 MILLIGRAM(S): at 00:30

## 2022-03-20 RX ADMIN — TRAMADOL HYDROCHLORIDE 50 MILLIGRAM(S): 50 TABLET ORAL at 12:21

## 2022-03-20 RX ADMIN — Medication 650 MILLIGRAM(S): at 05:13

## 2022-03-20 RX ADMIN — Medication 5 MILLIGRAM(S): at 09:20

## 2022-03-20 RX ADMIN — TRAMADOL HYDROCHLORIDE 50 MILLIGRAM(S): 50 TABLET ORAL at 00:55

## 2022-03-20 RX ADMIN — ONDANSETRON 4 MILLIGRAM(S): 8 TABLET, FILM COATED ORAL at 12:31

## 2022-03-20 RX ADMIN — Medication 400 MILLIGRAM(S): at 09:59

## 2022-03-20 RX ADMIN — Medication 500 MILLIGRAM(S): at 04:45

## 2022-03-20 RX ADMIN — TRAMADOL HYDROCHLORIDE 50 MILLIGRAM(S): 50 TABLET ORAL at 11:08

## 2022-03-20 RX ADMIN — Medication 75 MICROGRAM(S): at 05:12

## 2022-03-20 RX ADMIN — Medication 500 MILLIGRAM(S): at 09:50

## 2022-03-20 RX ADMIN — POLYETHYLENE GLYCOL 3350 17 GRAM(S): 17 POWDER, FOR SOLUTION ORAL at 09:50

## 2022-03-20 RX ADMIN — Medication 400 MILLIGRAM(S): at 09:15

## 2022-03-20 RX ADMIN — Medication 650 MILLIGRAM(S): at 12:21

## 2022-03-20 NOTE — DISCHARGE NOTE NURSING/CASE MANAGEMENT/SOCIAL WORK - BRAND OF FIRST COVID-19 BOOSTER
06/13/18 1414   Provider Notification   Provider Name/Title Dr. Marcus   Method of Notification At Bedside     Epidural placement   [employer]

## 2022-03-20 NOTE — DISCHARGE NOTE NURSING/CASE MANAGEMENT/SOCIAL WORK - NSDCPEFALRISK_GEN_ALL_CORE
For information on Fall & Injury Prevention, visit: [email or web address] OR  [email or web address] OR  https://www.cdc.gov/steadi/patient.html

## 2022-03-20 NOTE — PROGRESS NOTE ADULT - SUBJECTIVE AND OBJECTIVE BOX
POD1 S/P b/l mastectomy w/ L SLND and b/l reconstruction w/ expanders    SUBJECTIVE: Patient was visited bedside with the surgery team this morning. Complains of pain over mastectomy sites, especially on right side. States nausea has been improved. Denies fever, chills, dizziness, light-headedness, palpitation, shortness of breath, coughs, chest pain, or pain in extremities.      MEDICATIONS  (STANDING):  acetaminophen   IVPB .. 1000 milliGRAM(s) IV Intermittent once  acetaminophen   IVPB .. 1000 milliGRAM(s) IV Intermittent once  atorvastatin 10 milliGRAM(s) Oral at bedtime  bisacodyl 5 milliGRAM(s) Oral at bedtime  BUpivacaine liposome 1.3% Injectable (no eMAR) 20 milliLiter(s) Local Injection once  ceFAZolin   IVPB 2000 milliGRAM(s) IV Intermittent every 8 hours  lactated ringers. 1000 milliLiter(s) (100 mL/Hr) IV Continuous <Continuous>  levothyroxine 75 MICROGram(s) Oral daily    MEDICATIONS  (PRN):  ondansetron Injectable 4 milliGRAM(s) IV Push every 6 hours PRN Nausea and/or Vomiting  oxyCODONE    IR 5 milliGRAM(s) Oral every 6 hours PRN For breakthrough severe pain      Vital Signs Last 24 Hrs  T(C): 36.9 (18 Mar 2022 05:25), Max: 36.9 (17 Mar 2022 21:57)  T(F): 98.4 (18 Mar 2022 05:25), Max: 98.4 (17 Mar 2022 21:57)  HR: 72 (18 Mar 2022 04:05) (72 - 107)  BP: 107/53 (18 Mar 2022 04:05) (107/53 - 133/74)  BP(mean): 77 (18 Mar 2022 04:05) (74 - 96)  RR: 18 (18 Mar 2022 04:05) (10 - 25)  SpO2: 95% (18 Mar 2022 04:05) (95% - 100%)    Physical Exam:  General: NAD, resting comfortably in bed  Pulmonary: Nonlabored breathing, no respiratory distress  Cardiovascular: NSR  Breasts: Bilateral non-nipple sparing mastectomy incisions covered by Steri-strips and gauzes. Incisions with minimal staining w/ dried blood. Mild swelling, tenderness and bruises over right mastectomy site w/o active oozing, bleeding, or discharge noted. Left axilla soft w/o bulging or hematoma. JPs (x2) sanguinous   Abdominal: soft, NT/ND, obese  Extremities: WWP, normal strength  Neuro: A/O x 3, CNs II-XII grossly intact, no focal deficits, normal motor/sensation  Pulses: palpable distal pulses    I&O's Summary    17 Mar 2022 07:01  -  18 Mar 2022 07:00  --------------------------------------------------------  IN: 2190 mL / OUT: 1672.5 mL / NET: 517.5 mL        LABS:                        11.1   10.68 )-----------( 201      ( 18 Mar 2022 06:03 )             35.1     03-18    138  |  106  |  12  ----------------------------<  104<H>  3.8   |  24  |  0.57    Ca    8.5      18 Mar 2022 06:03  Phos  3.6     03-18  Mg     2.1     03-18          CAPILLARY BLOOD GLUCOSE            RADIOLOGY & ADDITIONAL STUDIES:  
POST-OP DAY: 1 s/p B/L mastectomy w direct silicon implants w alloderm.      SUBJECTIVE: Patient seen and examined bedside by chief resident. HDS + Afebrile, complaining of severe pain + tenderness    ceFAZolin   IVPB 2000 milliGRAM(s) IV Intermittent every 8 hours    MEDICATIONS  (PRN):  ondansetron Injectable 4 milliGRAM(s) IV Push every 6 hours PRN Nausea and/or Vomiting  oxyCODONE    IR 5 milliGRAM(s) Oral every 6 hours PRN For breakthrough severe pain      I&O's Detail    17 Mar 2022 07:01  -  18 Mar 2022 07:00  --------------------------------------------------------  IN:    IV PiggyBack: 200 mL    Lactated Ringers: 1850 mL    Oral Fluid: 240 mL  Total IN: 2290 mL    OUT:    Bulb (mL): 90 mL    Bulb (mL): 82.5 mL    Voided (mL): 1500 mL  Total OUT: 1672.5 mL    Total NET: 617.5 mL          Vital Signs Last 24 Hrs  T(C): 36.9 (18 Mar 2022 05:25), Max: 36.9 (17 Mar 2022 21:57)  T(F): 98.4 (18 Mar 2022 05:25), Max: 98.4 (17 Mar 2022 21:57)  HR: 72 (18 Mar 2022 04:05) (72 - 107)  BP: 107/53 (18 Mar 2022 04:05) (107/53 - 133/74)  BP(mean): 77 (18 Mar 2022 04:05) (74 - 96)  RR: 18 (18 Mar 2022 04:05) (10 - 25)  SpO2: 95% (18 Mar 2022 04:05) (95% - 100%)    General: NAD, resting comfortably in bed  C/V: NSR  Pulm: Nonlabored breathing, no respiratory distress  Breasts: diffusely TTP w scant ecchymoses present b/l, generalized asymmetrical swelling R>L    LABS:                        11.1   10.68 )-----------( 201      ( 18 Mar 2022 06:03 )             35.1     03-18    138  |  106  |  12  ----------------------------<  104<H>  3.8   |  24  |  0.57    Ca    8.5      18 Mar 2022 06:03  Phos  3.6     03-18  Mg     2.1     03-18            RADIOLOGY & ADDITIONAL STUDIES:    
Patient has been walking today and tolerating her diet. Still constipated. Mild dizziness after oxycodone.     AVSS drains right 40, left 60 - both thin serosanguinous. Bilateral breasts soft, no collections.    Patient is POD#2 s/p bilateral mastectomy with implant reconstruction and POD#1 s/p right breast exploration (no hematoma found). I will discontinue oxycodone which is contributing to patient's dizziness and constipation and add in motrin and tramadol prn medications. Patient encouraged to continue to move arms and care for herself. Likely discharge tomorrow. 
STATUS POST:  3/17: breast IDC and PSH C/S(x3) presents for b/l simple mastectomy and L SLND   3/18:  RTOR for right breast exploration, no bleeding or hematoma noted, right breast irrigated and new implant placed (3/18      24 hours:  3/18: Suspected right-side hematoma (tender, mildly swollen, w/ bruise), RTOR with plastics, no bleeding or hematoma noted, right breast irrigated and new implant placed. POC wnl.   ON: passed TOV, pain control    SUBJECTIVE: Pt seen and examined at bedside this am by surgery team. Tolerating diet, feeling nauseous and dizzy when woke up - improved. Has been too scared to ambulate    MEDICATIONS  (STANDING):  acetaminophen     Tablet .. 650 milliGRAM(s) Oral every 6 hours  atorvastatin 10 milliGRAM(s) Oral at bedtime  levothyroxine 75 MICROGram(s) Oral daily    MEDICATIONS  (PRN):  benzocaine 15 mG/menthol 3.6 mG Lozenge 1 Lozenge Oral every 4 hours PRN Sore Throat  bisacodyl 5 milliGRAM(s) Oral at bedtime PRN Constipation  HYDROmorphone  Injectable 0.5 milliGRAM(s) IV Push every 4 hours PRN Severe Pain (7 - 10)  ondansetron Injectable 4 milliGRAM(s) IV Push every 6 hours PRN Nausea and/or Vomiting  oxyCODONE    IR 5 milliGRAM(s) Oral every 4 hours PRN Mild Pain (1 - 3)  oxyCODONE    IR 10 milliGRAM(s) Oral every 4 hours PRN Moderate Pain (4 - 6)      Vital Signs Last 24 Hrs  T(C): 36.9 (19 Mar 2022 10:00), Max: 37.3 (18 Mar 2022 18:35)  T(F): 98.5 (19 Mar 2022 10:00), Max: 99.1 (18 Mar 2022 18:35)  HR: 86 (19 Mar 2022 12:20) (76 - 88)  BP: 118/65 (19 Mar 2022 12:20) (101/54 - 141/83)  BP(mean): 86 (19 Mar 2022 12:20) (74 - 86)  RR: 19 (19 Mar 2022 12:20) (16 - 21)  SpO2: 93% (19 Mar 2022 12:20) (93% - 98%)    Physical Exam  General: NAD, resting comfortably in bed  Breast: R breast mild swelling, Incision b/l c/d/i, minimal ecchymosis around incisions, BERNICE serosang  Pulmonary: Nonlabored breathing, no respiratory distress  CV: NSR  Abd: soft, NT/ND, no guarding,  Extremities: (-) edema, warm, well-perfused      I&O's Detail    18 Mar 2022 07:01  -  19 Mar 2022 07:00  --------------------------------------------------------  IN:    IV PiggyBack: 200 mL    Lactated Ringers: 990 mL    Lactated Ringers: 400 mL  Total IN: 1590 mL    OUT:    Bulb (mL): 70 mL    Bulb (mL): 90 mL    Voided (mL): 2700 mL  Total OUT: 2860 mL    Total NET: -1270 mL      19 Mar 2022 07:01  -  19 Mar 2022 15:27  --------------------------------------------------------  IN:  Total IN: 0 mL    OUT:    Bulb (mL): 45 mL    Bulb (mL): 30 mL  Total OUT: 75 mL    Total NET: -75 mL          LABS:                        11.0   10.59 )-----------( 186      ( 19 Mar 2022 06:31 )             34.3     03-19    139  |  104  |  15  ----------------------------<  97  3.9   |  23  |  0.67    Ca    8.2<L>      19 Mar 2022 06:31  Phos  3.0     03-19  Mg     1.8     03-19            RADIOLOGY & ADDITIONAL STUDIES:
POST-OP DAY: 2 s/p B/L mastectomy w direct silicon implants w alloderm. 1 s/p takeback for possible hematoma (negative upon exploration).     SUBJECTIVE: Patient seen and examined bedside. HDS + Afebrile. Complaining of R lateral breast and arm pain. She does not feel comfortable to go home today.    General: NAD, resting comfortably in bed  C/V: NSR  Pulm: Nonlabored breathing, no respiratory distress  Breasts: generalized asymmetrical swelling R>L, soft to touch. R lateral tenderness.     Hgb stable ~11  
Team 4 Surgery Post-Op Note, PCN:     Pre-Op Dx: breast cancer  Procedure: Bilateral skin sparing mastectomy    Bilateral skin sparing mastectomy with unilateral sentinel lymph node biopsy and axillary lymphadenectomy    Bilateral mastectomy with insertion of tissue expander on both sides    Exploration of right breast      Surgeon: Claude    Subjective: Pt seen and examined at bedside 2 hours post-op. Pt is doing well, resting in bed. Pt reporting appropriate post-op incisional pain.    Vital Signs Last 24 Hrs  T(C): 36.7 (18 Mar 2022 15:55), Max: 37.1 (18 Mar 2022 10:13)  T(F): 98.1 (18 Mar 2022 15:55), Max: 98.8 (18 Mar 2022 10:13)  HR: 88 (18 Mar 2022 15:55) (72 - 90)  BP: 109/56 (18 Mar 2022 15:25) (100/63 - 127/60)  BP(mean): 78 (18 Mar 2022 15:25) (74 - 86)  RR: 21 (18 Mar 2022 15:55) (9 - 24)  SpO2: 96% (18 Mar 2022 15:55) (95% - 100%)    Physical Exam:  General: NAD, resting comfortably in bed  Pulmonary: Nonlabored breathing, no respiratory distress  Cardiovascular: NSR  Breast: bilateral non-nipple sparing mastectomy incisions covered by Steri-strips and gauzes. Incisions with minimal staining w/ dried blood. Mild tenderness and bruises over b/l incisions. Incisions clean dry and intact. JPs (x2) sanguinous   Extremities: WWP, normal strength        LABS:                        11.1   10.68 )-----------( 201      ( 18 Mar 2022 06:03 )             35.1     03-18    138  |  106  |  12  ----------------------------<  104<H>  3.8   |  24  |  0.57    Ca    8.5      18 Mar 2022 06:03  Phos  3.6     03-18  Mg     2.1     03-18      
POST-OP DAY: 3 s/p B/L mastectomy w direct silicon implants w alloderm. POD 2 s/p takeback for possible hematoma (negative upon exploration).     SUBJECTIVE: Patient seen and examined bedside. HDS + Afebrile. Much improved R lateral breast pain. Has been tolearting oral intake and ambulating.    General: NAD, resting comfortably in bed  Pulm: Nonlabored breathing, no respiratory distress  Breasts: generalized asymmetrical swelling R>L, soft to touch. R lateral tenderness. BERNICE drains serosang.    Hgb stable  
Team 5 Surgery Post-Op Note, PCN:     Pre-Op Dx: Invasive ductal carcinoma of left breast  Procedure: Bilateral non-nipple sparing mastectomy with left sentinel lymph node excision and b/l reconstruction w/ tissue expanders  Surgeon: Dr. Onofre    Subjective: Patient visited bedside in PACU post-op. Recovering from surgery performed by Dr. Onofre 2 hours ago. Is awake, alert and oriented. Complains of nausea which has been fairly improved after IV medication. Pain controlled by medication. Is on regular diet. Has not voided yet. Denies fever, chills, palpitation, nausea, vomiting, shortness of breath, chest pain, or pain in extremities.      Vital Signs Last 24 Hrs  T(C): 36.1 (17 Mar 2022 12:31), Max: 36.7 (17 Mar 2022 06:41)  T(F): 97 (17 Mar 2022 12:31), Max: 98 (17 Mar 2022 06:41)  HR: 107 (17 Mar 2022 14:00) (81 - 107)  BP: 133/74 (17 Mar 2022 14:00) (106/72 - 133/74)  BP(mean): 96 (17 Mar 2022 14:00) (81 - 96)  RR: 16 (17 Mar 2022 14:00) (10 - 25)  SpO2: 96% (17 Mar 2022 14:00) (95% - 99%)    Physical Exam:  General: NAD, resting comfortably in bed  Pulmonary: Nonlabored breathing, no respiratory distress  Cardiovascular: NSR  Breasts: Bilateral non-nipple sparing mastectomy incisions covered by Steri-strips and gauzes. Incisions with minimal staining w/ dried blood. No marked tenderness, active oozing, bleeding, or discharge noted. Left axilla soft w/o bulging or hematoma  Abdominal: soft, NT/ND, obese  Extremities: WWP, normal strength  Neuro: A/O x 3, CNs II-XII grossly intact, no focal deficits, normal motor/sensation  Pulses: palpable distal pulses      LABS:            CAPILLARY BLOOD GLUCOSE                  Radiology and Additional Studies:

## 2022-03-20 NOTE — PROGRESS NOTE ADULT - ASSESSMENT
46yo F PMH HLD & hypothyroidism, unclear cardia hx, and L breast IDC and PSH C/S(x3) presents for b/l simple mastectomy and L SLND s/p RTOR for right breast exploration, no bleeding or hematoma noted, right breast irrigated and new implant placed.     - Regular diet  - IVF (LR@100cc/hr)  - IVAB (2gr Ancef q8h x1day) to c/w PO (Keflex 500mg q6h)   - Pain control standing (Tylenol) and prn (oxycodone)  - Nausea control prn  - SCDs  - OOBA/IS  - AM labs  - JPs (x2)
46yo F PMH HLD & hypothyroidism, unclear cardia hx, and L breast IDC and PSH C/S(x3) presents for b/l simple mastectomy and L SLND.    - 23hr observation (Tele)  - Regular diet  - IVF (LR@100cc/hr)  - IVAB (2gr Ancef q8h x1day) to c/w PO (Keflex 500mg q6h)   - Pain control standing (Tylenol) and prn (oxycodone)  - Nausea control prn  - Restart home meds (Synthroid, atorvastatin, and Myrbetriq) tomorrow  - SCDs  - OOBA/IS  - AM labs  - JPs (x2)  - Loop recorder (Lt breast LLQ)  
46yo F, POD 2 s/p B/L mastectomy w direct silicon implants w alloderm. 1 s/p takeback for possible hematoma (negative upon exploration).    - Reg diet  - Pain control  - Encourage ambulation  - Anticipate discharge home tomorrow AM
48yo F, POD 3 s/p B/L mastectomy w direct silicon implants w alloderm. 2 s/p takeback for possible hematoma (negative upon exploration).    - Reg diet  - Pain control  - Encourage ambulation  - Continue BERNICE drains  - Follow up with Dr. Arce in 1 week  - Anticipate discharge home today
46yo F PMH HLD & hypothyroidism, unclear cardia hx, and L breast IDC and PSH C/S(x3) presents for b/l simple mastectomy and L SLND s/p RTOR for right breast exploration, no bleeding or hematoma noted, right breast irrigated and new implant placed (3/18)    - Regular  - IVF (LR@100cc/hr)  - IVAB (2gr Ancef q8h x1day) to c/w PO (Keflex 500mg q6h)   - Pain control standing (Tylenol) and prn (oxycodone)  - Nausea control prn  - SCDs  - OOBA/IS  - AM labs  - JPs (x2)  - Loop recorder (Lt breast LLQ)  
46yo F PMH HLD & hypothyroidism, unclear cardia hx, and L breast IDC and PSH C/S(x3) presents for b/l simple mastectomy and L SLND. Pain fairly controlled post-op. Has tenderness, mild swelling and bruises over right side. AVSS. JPs (x2) low sanguinous output.    - 23hr observation (Tele)  - Regular diet  - IVF (LR@100cc/hr)  - IVAB (2gr Ancef q8h x1day) to c/w PO (Keflex 500mg q6h)   - Pain control standing (Tylenol) and prn (oxycodone)  - Nausea control prn  - SCDs  - OOBA/IS  - AM labs  - JPs (x2)  - Loop recorder (Lt breast LLQ)  
46yo F, POD 1 s/p B/L mastectomy w direct silicon implants w alloderm. PE concerning for hematoma in R breast, given prepec implant placement will plan for RTOR today for explant, washout and possible reimplantation.     - NPO   - Hold all chemoprophylaxis  - will add on to OR schedule, Class 3, Arce   - Plastics to cover

## 2022-03-20 NOTE — PROGRESS NOTE ADULT - PROVIDER SPECIALTY LIST ADULT
Surgery
Plastic Surgery
Surgery

## 2022-03-20 NOTE — DISCHARGE NOTE NURSING/CASE MANAGEMENT/SOCIAL WORK - PATIENT PORTAL LINK FT
You can access the FollowMyHealth Patient Portal offered by [facility] by registering at the following website: [email or web address]. By joining [employer]’s FollowMyHealth portal, you will also be able to view your health information using other applications (apps) compatible with our system.

## 2022-03-23 DIAGNOSIS — E03.9 HYPOTHYROIDISM, UNSPECIFIED: ICD-10-CM

## 2022-03-23 DIAGNOSIS — C50.912 MALIGNANT NEOPLASM OF UNSPECIFIED SITE OF LEFT FEMALE BREAST: ICD-10-CM

## 2022-03-23 DIAGNOSIS — Z90.13 ACQUIRED ABSENCE OF BILATERAL BREASTS AND NIPPLES: ICD-10-CM

## 2022-03-23 DIAGNOSIS — E78.5 HYPERLIPIDEMIA, UNSPECIFIED: ICD-10-CM

## 2022-03-23 DIAGNOSIS — N63.10 UNSPECIFIED LUMP IN THE RIGHT BREAST, UNSPECIFIED QUADRANT: ICD-10-CM

## 2022-03-23 LAB — SURGICAL PATHOLOGY STUDY: SIGNIFICANT CHANGE UP

## 2022-04-03 PROBLEM — D64.9 ANEMIA, UNSPECIFIED: Chronic | Status: ACTIVE | Noted: 2022-03-16

## 2022-04-03 PROBLEM — N80.9 ENDOMETRIOSIS, UNSPECIFIED: Chronic | Status: ACTIVE | Noted: 2022-03-16

## 2022-04-03 PROBLEM — Z98.890 OTHER SPECIFIED POSTPROCEDURAL STATES: Chronic | Status: ACTIVE | Noted: 2022-03-16

## 2022-04-03 PROBLEM — E78.5 HYPERLIPIDEMIA, UNSPECIFIED: Chronic | Status: ACTIVE | Noted: 2022-03-16

## 2022-04-03 PROBLEM — G43.909 MIGRAINE, UNSPECIFIED, NOT INTRACTABLE, WITHOUT STATUS MIGRAINOSUS: Chronic | Status: ACTIVE | Noted: 2022-03-16

## 2022-04-06 ENCOUNTER — TRANSCRIPTION ENCOUNTER (OUTPATIENT)
Age: 47
End: 2022-04-06

## 2022-04-06 NOTE — ASU PATIENT PROFILE, ADULT - FALL HARM RISK - UNIVERSAL INTERVENTIONS
Bed in lowest position, wheels locked, appropriate side rails in place/Call bell, personal items and telephone in reach/Instruct patient to call for assistance before getting out of bed or chair/Non-slip footwear when patient is out of bed/[place] to call system/Physically safe environment - no spills, clutter or unnecessary equipment/Purposeful Proactive Rounding/Room/bathroom lighting operational, light cord in reach

## 2022-04-06 NOTE — ASU PATIENT PROFILE, ADULT - NSICDXPASTSURGICALHX_GEN_ALL_CORE_FT
PAST SURGICAL HISTORY:  Breast cyst, right      delivery delivered x3    H/O bilateral mastectomy     History of varicose veins

## 2022-04-06 NOTE — ASU PATIENT PROFILE, ADULT - LANGUAGE ASSISTANCE NEEDED
No-Patient/Caregiver offered and refused free interpretation services. RN spoke Haitian to Pt/No-Patient/Caregiver offered and refused free interpretation services.

## 2022-04-06 NOTE — ASU PATIENT PROFILE, ADULT - NSICDXPASTMEDICALHX_GEN_ALL_CORE_FT
PAST MEDICAL HISTORY:  Anemia     Endometriosis fibroids    History of loop recorder palpitations    HLD (hyperlipidemia)     Hypothyroidism     Migraines

## 2022-04-07 ENCOUNTER — OUTPATIENT (OUTPATIENT)
Dept: INPATIENT UNIT | Facility: HOSPITAL | Age: 47
LOS: 1 days | Discharge: HOME | End: 2022-04-07
Payer: MEDICAID

## 2022-04-07 ENCOUNTER — TRANSCRIPTION ENCOUNTER (OUTPATIENT)
Age: 47
End: 2022-04-07

## 2022-04-07 ENCOUNTER — RESULT REVIEW (OUTPATIENT)
Age: 47
End: 2022-04-07

## 2022-04-07 VITALS
OXYGEN SATURATION: 97 % | DIASTOLIC BLOOD PRESSURE: 70 MMHG | SYSTOLIC BLOOD PRESSURE: 103 MMHG | RESPIRATION RATE: 12 BRPM | HEART RATE: 74 BPM

## 2022-04-07 VITALS
DIASTOLIC BLOOD PRESSURE: 67 MMHG | TEMPERATURE: 97 F | SYSTOLIC BLOOD PRESSURE: 99 MMHG | HEIGHT: 61 IN | OXYGEN SATURATION: 99 % | WEIGHT: 157.19 LBS | RESPIRATION RATE: 16 BRPM | HEART RATE: 65 BPM

## 2022-04-07 DIAGNOSIS — N60.01 SOLITARY CYST OF RIGHT BREAST: Chronic | ICD-10-CM

## 2022-04-07 DIAGNOSIS — Z90.13 ACQUIRED ABSENCE OF BILATERAL BREASTS AND NIPPLES: Chronic | ICD-10-CM

## 2022-04-07 DIAGNOSIS — Z86.79 PERSONAL HISTORY OF OTHER DISEASES OF THE CIRCULATORY SYSTEM: Chronic | ICD-10-CM

## 2022-04-07 PROCEDURE — 71045 X-RAY EXAM CHEST 1 VIEW: CPT | Mod: 26

## 2022-04-07 PROCEDURE — 76000 FLUOROSCOPY <1 HR PHYS/QHP: CPT

## 2022-04-07 PROCEDURE — C1788: CPT

## 2022-04-07 PROCEDURE — 71045 X-RAY EXAM CHEST 1 VIEW: CPT

## 2022-04-07 PROCEDURE — 36561 INSERT TUNNELED CV CATH: CPT

## 2022-04-07 DEVICE — PORT INFUSE SNGL TI 8FR 7.2FR: Type: IMPLANTABLE DEVICE | Status: FUNCTIONAL

## 2022-04-07 RX ORDER — OXYCODONE HYDROCHLORIDE 5 MG/1
5 TABLET ORAL ONCE
Refills: 0 | Status: DISCONTINUED | OUTPATIENT
Start: 2022-04-07 | End: 2022-04-07

## 2022-04-07 RX ORDER — ONDANSETRON 8 MG/1
4 TABLET, FILM COATED ORAL ONCE
Refills: 0 | Status: COMPLETED | OUTPATIENT
Start: 2022-04-07 | End: 2022-04-07

## 2022-04-07 RX ORDER — HYDROMORPHONE HYDROCHLORIDE 2 MG/ML
0.5 INJECTION INTRAMUSCULAR; INTRAVENOUS; SUBCUTANEOUS
Refills: 0 | Status: DISCONTINUED | OUTPATIENT
Start: 2022-04-07 | End: 2022-04-07

## 2022-04-07 RX ORDER — SODIUM CHLORIDE 9 MG/ML
1000 INJECTION, SOLUTION INTRAVENOUS
Refills: 0 | Status: DISCONTINUED | OUTPATIENT
Start: 2022-04-07 | End: 2022-04-07

## 2022-04-07 RX ORDER — BENZOCAINE AND MENTHOL 5; 1 G/100ML; G/100ML
1 LIQUID ORAL ONCE
Refills: 0 | Status: COMPLETED | OUTPATIENT
Start: 2022-04-07 | End: 2022-04-07

## 2022-04-07 RX ORDER — ACETAMINOPHEN 500 MG
650 TABLET ORAL ONCE
Refills: 0 | Status: DISCONTINUED | OUTPATIENT
Start: 2022-04-07 | End: 2022-04-07

## 2022-04-07 RX ADMIN — HYDROMORPHONE HYDROCHLORIDE 0.5 MILLIGRAM(S): 2 INJECTION INTRAMUSCULAR; INTRAVENOUS; SUBCUTANEOUS at 09:55

## 2022-04-07 RX ADMIN — OXYCODONE HYDROCHLORIDE 5 MILLIGRAM(S): 5 TABLET ORAL at 13:35

## 2022-04-07 RX ADMIN — HYDROMORPHONE HYDROCHLORIDE 0.5 MILLIGRAM(S): 2 INJECTION INTRAMUSCULAR; INTRAVENOUS; SUBCUTANEOUS at 11:28

## 2022-04-07 RX ADMIN — ONDANSETRON 4 MILLIGRAM(S): 8 TABLET, FILM COATED ORAL at 16:45

## 2022-04-07 RX ADMIN — HYDROMORPHONE HYDROCHLORIDE 0.5 MILLIGRAM(S): 2 INJECTION INTRAMUSCULAR; INTRAVENOUS; SUBCUTANEOUS at 11:56

## 2022-04-07 RX ADMIN — ONDANSETRON 4 MILLIGRAM(S): 8 TABLET, FILM COATED ORAL at 13:48

## 2022-04-07 RX ADMIN — HYDROMORPHONE HYDROCHLORIDE 0.5 MILLIGRAM(S): 2 INJECTION INTRAMUSCULAR; INTRAVENOUS; SUBCUTANEOUS at 10:20

## 2022-04-07 RX ADMIN — BENZOCAINE AND MENTHOL 1 LOZENGE: 5; 1 LIQUID ORAL at 11:57

## 2022-04-07 NOTE — ASU DISCHARGE PLAN (ADULT/PEDIATRIC) - ASU DC SPECIAL INSTRUCTIONSFT
General Instructions:  - Please follow-up with Dr. Onofre. You may call his office at the number below to make your appointment if you have not already done so.  - Please resume all regular home medications unless specifically advised not to take a particular medication. Also, please take any new medications as prescribed.   - Please get plenty of rest, continue to ambulate several times per day, and drink adequate amounts of fluids.  - Avoid driving or operating heavy machinery while taking pain medications. Please follow-up with your surgeon and Primary Care Provider (PCP) as advised.

## 2022-04-07 NOTE — ASU DISCHARGE PLAN (ADULT/PEDIATRIC) - NS MD DC FALL RISK RISK
For information on Fall & Injury Prevention, visit: [email or web address] OR  [email or web address] OR  https://www.cdc.gov/steadi/patient.html

## 2022-04-07 NOTE — ASU DISCHARGE PLAN (ADULT/PEDIATRIC) - CARE PROVIDER_API CALL
Martin Onofre (DO)  Surgery  [address], [place] [address]  Phone: [phone]  Fax: [phone]  Follow Up Time:

## 2022-04-07 NOTE — BRIEF OPERATIVE NOTE - NSICDXBRIEFPROCEDURE_GEN_ALL_CORE_FT
PROCEDURES:  Insertion, central venous catheter, tunneled, with port, age 5 years or older 07-Apr-2022 08:53:24  Mook Duggan

## 2022-04-07 NOTE — PACU DISCHARGE NOTE - COMMENTS
Discharge instruction reviewed in-full with pt and her daughter, good understanding verbalized, pt left unit via w/c accompanied by pacu staff condition stable.

## 2022-04-07 NOTE — CHART NOTE - NSCHARTNOTEFT_GEN_A_CORE
POSTOPERATIVE CHECK    Patient resting comfortably in bed. Pain and nausea well controlled. No fevers, chills or vomiting.  Denies SOB, chest pain, or lower extremity pain. Incisions clean, dry and intact; no hematoma noted. Patient oxygenating well, vital signs within normal limits.     Vital Signs Last 24 Hrs  T(C): 36.3 (07 Apr 2022 07:01), Max: 36.3 (07 Apr 2022 07:01)  T(F): 97.3 (07 Apr 2022 07:01), Max: 97.3 (07 Apr 2022 07:01)  HR: 89 (07 Apr 2022 13:36) (65 - 95)  BP: 99/61 (07 Apr 2022 13:36) (99/61 - 131/76)  BP(mean): 75 (07 Apr 2022 13:36) (72 - 97)  RR: 11 (07 Apr 2022 13:36) (11 - 16)  SpO2: 97% (07 Apr 2022 13:36) (87% - 99%)  I&O's Detail      PHYSICAL EXAM    General: NAD, resting comfortably in bed  C/V: NSR  Pulm: Nonlabored breathing, no respiratory distress on room air  Abd: soft, NTND, no rebound tenderness, no guarding  Extrem: WWP, no edema, SCDs in place        LABS:                RADIOLOGY & ADDITIONAL STUDIES:

## 2022-04-07 NOTE — DISCHARGE NOTE NURSING/CASE MANAGEMENT/SOCIAL WORK - PATIENT PORTAL LINK FT
You can access the FollowMyHealth Patient Portal offered by [facility] by registering at the following website: [email or web address]. By joining [employer]’s FollowMyHealth portal, you will also be able to view your health information using other applications (apps) compatible with our system.

## 2022-05-10 NOTE — ED ADULT TRIAGE NOTE - STATUS:
Date of delivery: 22    Are you breastfeeding?Was breastfeeding but is down to 1 pumping a day and plans to transition to bottle feeding.     What type of delivery did you have?    Are you having any pain or discomfort? Just some pain with breast during weaning, taking ibuprofen. No fever or hot spots.     Are you getting enough support at home? YES    How much rest are you getting? 4-6 hrs per night with naps in the daytime.   Were you gestational diabetic during your pregnancy? NO    Have you experienced any symptoms of postpartum depression?   NO    Patient was instructed to: YES  -- avoid sexual intercourse for 6 weeks after delivery.   -- avoid lifting anything heavier than the baby for at least the first 6 weeks after delivery.  -- call the office if:       vaginal discharge increases or requires changing sanitary pads more than once an hour.       fever or chills.       dizziness.       nausea or vomiting.       shortness of breath.       pain and burning during urination.       painful red breasts.    Have you scheduled your postpartum checkup?Yes 22    CORNELIUS He RN         [employer]

## 2022-05-12 ENCOUNTER — RESULT REVIEW (OUTPATIENT)
Age: 47
End: 2022-05-12

## 2022-05-13 ENCOUNTER — INPATIENT (INPATIENT)
Facility: HOSPITAL | Age: 47
LOS: 3 days | Discharge: HOME | DRG: 395 | End: 2022-05-17
Attending: INTERNAL MEDICINE | Admitting: INTERNAL MEDICINE
Payer: MEDICAID

## 2022-05-13 VITALS
RESPIRATION RATE: 18 BRPM | DIASTOLIC BLOOD PRESSURE: 70 MMHG | TEMPERATURE: 99 F | SYSTOLIC BLOOD PRESSURE: 117 MMHG | WEIGHT: 154.32 LBS | OXYGEN SATURATION: 100 % | HEART RATE: 92 BPM | HEIGHT: 61 IN

## 2022-05-13 DIAGNOSIS — N60.01 SOLITARY CYST OF RIGHT BREAST: Chronic | ICD-10-CM

## 2022-05-13 DIAGNOSIS — Z86.79 PERSONAL HISTORY OF OTHER DISEASES OF THE CIRCULATORY SYSTEM: Chronic | ICD-10-CM

## 2022-05-13 DIAGNOSIS — Z90.13 ACQUIRED ABSENCE OF BILATERAL BREASTS AND NIPPLES: Chronic | ICD-10-CM

## 2022-05-13 LAB
ALBUMIN SERPL ELPH-MCNC: 3.1 G/DL — LOW (ref 3.5–5)
ALP SERPL-CCNC: 134 U/L — HIGH (ref 40–120)
ALT FLD-CCNC: 60 U/L DA — SIGNIFICANT CHANGE UP (ref 10–60)
ANION GAP SERPL CALC-SCNC: 7 MMOL/L — SIGNIFICANT CHANGE UP (ref 5–17)
AST SERPL-CCNC: 43 U/L — HIGH (ref 10–40)
BASOPHILS # BLD AUTO: 0 K/UL — SIGNIFICANT CHANGE UP (ref 0–0.2)
BASOPHILS NFR BLD AUTO: 0 % — SIGNIFICANT CHANGE UP (ref 0–2)
BILIRUB SERPL-MCNC: 0.3 MG/DL — SIGNIFICANT CHANGE UP (ref 0.2–1.2)
BUN SERPL-MCNC: 11 MG/DL — SIGNIFICANT CHANGE UP (ref 7–18)
CALCIUM SERPL-MCNC: 8.9 MG/DL — SIGNIFICANT CHANGE UP (ref 8.4–10.5)
CHLORIDE SERPL-SCNC: 108 MMOL/L — SIGNIFICANT CHANGE UP (ref 96–108)
CO2 SERPL-SCNC: 26 MMOL/L — SIGNIFICANT CHANGE UP (ref 22–31)
CREAT SERPL-MCNC: 0.6 MG/DL — SIGNIFICANT CHANGE UP (ref 0.5–1.3)
EGFR: 113 ML/MIN/1.73M2 — SIGNIFICANT CHANGE UP
EOSINOPHIL # BLD AUTO: 0 K/UL — SIGNIFICANT CHANGE UP (ref 0–0.5)
EOSINOPHIL NFR BLD AUTO: 0 % — SIGNIFICANT CHANGE UP (ref 0–6)
GLUCOSE SERPL-MCNC: 107 MG/DL — HIGH (ref 70–99)
HCT VFR BLD CALC: 27.5 % — LOW (ref 34.5–45)
HGB BLD-MCNC: 8.7 G/DL — LOW (ref 11.5–15.5)
LACTATE SERPL-SCNC: 1.2 MMOL/L — SIGNIFICANT CHANGE UP (ref 0.7–2)
LIDOCAIN IGE QN: 149 U/L — SIGNIFICANT CHANGE UP (ref 73–393)
LYMPHOCYTES # BLD AUTO: 1.66 K/UL — SIGNIFICANT CHANGE UP (ref 1–3.3)
LYMPHOCYTES # BLD AUTO: 8 % — LOW (ref 13–44)
MANUAL SMEAR VERIFICATION: SIGNIFICANT CHANGE UP
MCHC RBC-ENTMCNC: 27.9 PG — SIGNIFICANT CHANGE UP (ref 27–34)
MCHC RBC-ENTMCNC: 31.6 GM/DL — LOW (ref 32–36)
MCV RBC AUTO: 88.1 FL — SIGNIFICANT CHANGE UP (ref 80–100)
METAMYELOCYTES # FLD: 6 % — HIGH (ref 0–0)
MONOCYTES # BLD AUTO: 1.04 K/UL — HIGH (ref 0–0.9)
MONOCYTES NFR BLD AUTO: 5 % — SIGNIFICANT CHANGE UP (ref 2–14)
MYELOCYTES NFR BLD: 1 % — HIGH (ref 0–0)
NEUTROPHILS # BLD AUTO: 16.58 K/UL — HIGH (ref 1.8–7.4)
NEUTROPHILS NFR BLD AUTO: 77 % — SIGNIFICANT CHANGE UP (ref 43–77)
NEUTS BAND # BLD: 3 % — SIGNIFICANT CHANGE UP (ref 0–8)
NRBC # BLD: 2 /100 — HIGH (ref 0–0)
PLAT MORPH BLD: NORMAL — SIGNIFICANT CHANGE UP
PLATELET # BLD AUTO: 282 K/UL — SIGNIFICANT CHANGE UP (ref 150–400)
POTASSIUM SERPL-MCNC: 3.4 MMOL/L — LOW (ref 3.5–5.3)
POTASSIUM SERPL-SCNC: 3.4 MMOL/L — LOW (ref 3.5–5.3)
PROT SERPL-MCNC: 6.5 G/DL — SIGNIFICANT CHANGE UP (ref 6–8.3)
RBC # BLD: 3.12 M/UL — LOW (ref 3.8–5.2)
RBC # FLD: 16 % — HIGH (ref 10.3–14.5)
RBC BLD AUTO: NORMAL — SIGNIFICANT CHANGE UP
SODIUM SERPL-SCNC: 141 MMOL/L — SIGNIFICANT CHANGE UP (ref 135–145)
WBC # BLD: 20.72 K/UL — HIGH (ref 3.8–10.5)
WBC # FLD AUTO: 20.72 K/UL — HIGH (ref 3.8–10.5)

## 2022-05-13 PROCEDURE — 99285 EMERGENCY DEPT VISIT HI MDM: CPT

## 2022-05-13 RX ORDER — HYDROMORPHONE HYDROCHLORIDE 2 MG/ML
1 INJECTION INTRAMUSCULAR; INTRAVENOUS; SUBCUTANEOUS ONCE
Refills: 0 | Status: DISCONTINUED | OUTPATIENT
Start: 2022-05-13 | End: 2022-05-13

## 2022-05-13 RX ORDER — ONDANSETRON 8 MG/1
4 TABLET, FILM COATED ORAL ONCE
Refills: 0 | Status: COMPLETED | OUTPATIENT
Start: 2022-05-13 | End: 2022-05-13

## 2022-05-13 RX ORDER — SODIUM CHLORIDE 9 MG/ML
1000 INJECTION INTRAMUSCULAR; INTRAVENOUS; SUBCUTANEOUS ONCE
Refills: 0 | Status: COMPLETED | OUTPATIENT
Start: 2022-05-13 | End: 2022-05-13

## 2022-05-13 RX ORDER — MORPHINE SULFATE 50 MG/1
4 CAPSULE, EXTENDED RELEASE ORAL ONCE
Refills: 0 | Status: DISCONTINUED | OUTPATIENT
Start: 2022-05-13 | End: 2022-05-13

## 2022-05-13 RX ADMIN — MORPHINE SULFATE 4 MILLIGRAM(S): 50 CAPSULE, EXTENDED RELEASE ORAL at 22:51

## 2022-05-13 RX ADMIN — ONDANSETRON 4 MILLIGRAM(S): 8 TABLET, FILM COATED ORAL at 22:51

## 2022-05-13 NOTE — ED ADULT NURSE NOTE - HAVE YOU HAD A FIRST COVID-19 BOOSTER?
Patient Information     Patient Name MRN Arlyn Winters [id number] Female 1946      Telephone Encounter by Ami Grullon at 3/2/2017  3:23 PM     Author:  Ami Grullon Service:  (none) Author Type:  (none)     Filed:  3/2/2017  3:32 PM Encounter Date:  3/2/2017 Status:  Signed     :  Ami Grullon            After birth date was verified, states she woke up with back pain last Friday.  She's had issues on and off since her fall last .  She has tried a few things, but nothing helps.  Thinks she needs a cortisone injection and wants Brooke Castaneda MD to order it.  Explained that she needs to be seen and possibly have a few other things done first.  Arranged an appointment for tomorrow with Brooke Castaneda MD.  Ami Grullon CMA (AAMA)................ 3/2/2017 3:31 PM           Yes

## 2022-05-13 NOTE — ED PROVIDER NOTE - OBJECTIVE STATEMENT
47-year-old female hx of breast cancer on chemo (last chemo May 5, 8 days ago) presenting with abdominal pain, vomiting, diarrhea since earlier today. Started 47-year-old female hx of breast cancer on chemo (last chemo May 5, 8 days ago) presenting with abdominal pain, vomiting, diarrhea since earlier today. Started after she ate some soup. No fevers or chills. No other symptoms.

## 2022-05-13 NOTE — ED ADULT TRIAGE NOTE - CHIEF COMPLAINT QUOTE
abdominal pain , nausea, vomiting and diarrhea since 3 pm today , had chemo for left breast CA last May 5

## 2022-05-13 NOTE — ED PROVIDER NOTE - CLINICAL SUMMARY MEDICAL DECISION MAKING FREE TEXT BOX
47-year-old female hx of breast cancer on chemo (last chemo May 5, 8 days ago) presenting with abdominal pain, vomiting, diarrhea since earlier today after eating some soup, possibly 2/2 viral gastroenteritis. Will check labs, CTAP, provide analgesia/antiemetics and reassess. 47-year-old female hx of breast cancer on chemo (last chemo May 5, 8 days ago) presenting with abdominal pain, vomiting, diarrhea since earlier today after eating some soup, possibly 2/2 viral gastroenteritis. Will check labs, CTAP, provide analgesia/antiemetics and reassess.    [place] 0253:  Pt s/o from Dr Carbone pending CT scan. CT showing no acute findings. WBC very high, pt denies any resp or urinary symptoms with UA and CXR pending. On reassessment pt states that she's still in pain and unable to tolerate PO. Admitting for further mgmt. Pt's PMD admits to Hugh. Pt stable and endorsed to MAR.

## 2022-05-14 DIAGNOSIS — D72.829 ELEVATED WHITE BLOOD CELL COUNT, UNSPECIFIED: ICD-10-CM

## 2022-05-14 DIAGNOSIS — E78.5 HYPERLIPIDEMIA, UNSPECIFIED: ICD-10-CM

## 2022-05-14 DIAGNOSIS — R10.9 UNSPECIFIED ABDOMINAL PAIN: ICD-10-CM

## 2022-05-14 DIAGNOSIS — K52.9 NONINFECTIVE GASTROENTERITIS AND COLITIS, UNSPECIFIED: ICD-10-CM

## 2022-05-14 DIAGNOSIS — Z29.9 ENCOUNTER FOR PROPHYLACTIC MEASURES, UNSPECIFIED: ICD-10-CM

## 2022-05-14 DIAGNOSIS — C50.919 MALIGNANT NEOPLASM OF UNSPECIFIED SITE OF UNSPECIFIED FEMALE BREAST: ICD-10-CM

## 2022-05-14 DIAGNOSIS — E03.9 HYPOTHYROIDISM, UNSPECIFIED: ICD-10-CM

## 2022-05-14 DIAGNOSIS — D64.9 ANEMIA, UNSPECIFIED: ICD-10-CM

## 2022-05-14 LAB
ALBUMIN SERPL ELPH-MCNC: 2.7 G/DL — LOW (ref 3.5–5)
ALP SERPL-CCNC: 116 U/L — SIGNIFICANT CHANGE UP (ref 40–120)
ALT FLD-CCNC: 47 U/L DA — SIGNIFICANT CHANGE UP (ref 10–60)
ANION GAP SERPL CALC-SCNC: 7 MMOL/L — SIGNIFICANT CHANGE UP (ref 5–17)
AST SERPL-CCNC: 29 U/L — SIGNIFICANT CHANGE UP (ref 10–40)
BASOPHILS # BLD AUTO: 0.13 K/UL — SIGNIFICANT CHANGE UP (ref 0–0.2)
BASOPHILS NFR BLD AUTO: 0.8 % — SIGNIFICANT CHANGE UP (ref 0–2)
BILIRUB SERPL-MCNC: 0.3 MG/DL — SIGNIFICANT CHANGE UP (ref 0.2–1.2)
BUN SERPL-MCNC: 11 MG/DL — SIGNIFICANT CHANGE UP (ref 7–18)
CALCIUM SERPL-MCNC: 7.9 MG/DL — LOW (ref 8.4–10.5)
CHLORIDE SERPL-SCNC: 110 MMOL/L — HIGH (ref 96–108)
CO2 SERPL-SCNC: 22 MMOL/L — SIGNIFICANT CHANGE UP (ref 22–31)
CREAT SERPL-MCNC: 0.53 MG/DL — SIGNIFICANT CHANGE UP (ref 0.5–1.3)
EGFR: 116 ML/MIN/1.73M2 — SIGNIFICANT CHANGE UP
EOSINOPHIL # BLD AUTO: 0.01 K/UL — SIGNIFICANT CHANGE UP (ref 0–0.5)
EOSINOPHIL NFR BLD AUTO: 0.1 % — SIGNIFICANT CHANGE UP (ref 0–6)
GLUCOSE SERPL-MCNC: 109 MG/DL — HIGH (ref 70–99)
HCG SERPL-ACNC: <1 MIU/ML — SIGNIFICANT CHANGE UP
HCT VFR BLD CALC: 26.2 % — LOW (ref 34.5–45)
HGB BLD-MCNC: 8.4 G/DL — LOW (ref 11.5–15.5)
IMM GRANULOCYTES NFR BLD AUTO: 15.8 % — HIGH (ref 0–1.5)
LYMPHOCYTES # BLD AUTO: 1.04 K/UL — SIGNIFICANT CHANGE UP (ref 1–3.3)
LYMPHOCYTES # BLD AUTO: 6.5 % — LOW (ref 13–44)
MAGNESIUM SERPL-MCNC: 1.5 MG/DL — LOW (ref 1.6–2.6)
MCHC RBC-ENTMCNC: 28.3 PG — SIGNIFICANT CHANGE UP (ref 27–34)
MCHC RBC-ENTMCNC: 32.1 GM/DL — SIGNIFICANT CHANGE UP (ref 32–36)
MCV RBC AUTO: 88.2 FL — SIGNIFICANT CHANGE UP (ref 80–100)
MONOCYTES # BLD AUTO: 1.17 K/UL — HIGH (ref 0–0.9)
MONOCYTES NFR BLD AUTO: 7.3 % — SIGNIFICANT CHANGE UP (ref 2–14)
NEUTROPHILS # BLD AUTO: 11.22 K/UL — HIGH (ref 1.8–7.4)
NEUTROPHILS NFR BLD AUTO: 69.5 % — SIGNIFICANT CHANGE UP (ref 43–77)
NRBC # BLD: 1 /100 WBCS — HIGH (ref 0–0)
PHOSPHATE SERPL-MCNC: 3.5 MG/DL — SIGNIFICANT CHANGE UP (ref 2.5–4.5)
PLATELET # BLD AUTO: 247 K/UL — SIGNIFICANT CHANGE UP (ref 150–400)
POTASSIUM SERPL-MCNC: 3.3 MMOL/L — LOW (ref 3.5–5.3)
POTASSIUM SERPL-SCNC: 3.3 MMOL/L — LOW (ref 3.5–5.3)
PROT SERPL-MCNC: 5.9 G/DL — LOW (ref 6–8.3)
RBC # BLD: 2.97 M/UL — LOW (ref 3.8–5.2)
RBC # FLD: 16.3 % — HIGH (ref 10.3–14.5)
SARS-COV-2 RNA SPEC QL NAA+PROBE: SIGNIFICANT CHANGE UP
SODIUM SERPL-SCNC: 139 MMOL/L — SIGNIFICANT CHANGE UP (ref 135–145)
WBC # BLD: 16.12 K/UL — HIGH (ref 3.8–10.5)
WBC # FLD AUTO: 16.12 K/UL — HIGH (ref 3.8–10.5)

## 2022-05-14 PROCEDURE — 71045 X-RAY EXAM CHEST 1 VIEW: CPT | Mod: 26

## 2022-05-14 PROCEDURE — 74176 CT ABD & PELVIS W/O CONTRAST: CPT | Mod: 26,MA

## 2022-05-14 RX ORDER — LEVOTHYROXINE SODIUM 125 MCG
88 TABLET ORAL DAILY
Refills: 0 | Status: DISCONTINUED | OUTPATIENT
Start: 2022-05-14 | End: 2022-05-17

## 2022-05-14 RX ORDER — METOCLOPRAMIDE HCL 10 MG
10 TABLET ORAL ONCE
Refills: 0 | Status: COMPLETED | OUTPATIENT
Start: 2022-05-14 | End: 2022-05-14

## 2022-05-14 RX ORDER — MORPHINE SULFATE 50 MG/1
2 CAPSULE, EXTENDED RELEASE ORAL ONCE
Refills: 0 | Status: DISCONTINUED | OUTPATIENT
Start: 2022-05-14 | End: 2022-05-14

## 2022-05-14 RX ORDER — MORPHINE SULFATE 50 MG/1
4 CAPSULE, EXTENDED RELEASE ORAL ONCE
Refills: 0 | Status: DISCONTINUED | OUTPATIENT
Start: 2022-05-14 | End: 2022-05-14

## 2022-05-14 RX ORDER — MAGNESIUM SULFATE 500 MG/ML
2 VIAL (ML) INJECTION ONCE
Refills: 0 | Status: COMPLETED | OUTPATIENT
Start: 2022-05-14 | End: 2022-05-14

## 2022-05-14 RX ORDER — PANTOPRAZOLE SODIUM 20 MG/1
40 TABLET, DELAYED RELEASE ORAL DAILY
Refills: 0 | Status: DISCONTINUED | OUTPATIENT
Start: 2022-05-14 | End: 2022-05-17

## 2022-05-14 RX ORDER — ACETAMINOPHEN 500 MG
650 TABLET ORAL ONCE
Refills: 0 | Status: COMPLETED | OUTPATIENT
Start: 2022-05-14 | End: 2022-05-14

## 2022-05-14 RX ORDER — SODIUM CHLORIDE 9 MG/ML
1000 INJECTION INTRAMUSCULAR; INTRAVENOUS; SUBCUTANEOUS
Refills: 0 | Status: DISCONTINUED | OUTPATIENT
Start: 2022-05-14 | End: 2022-05-17

## 2022-05-14 RX ORDER — ONDANSETRON 8 MG/1
4 TABLET, FILM COATED ORAL EVERY 8 HOURS
Refills: 0 | Status: DISCONTINUED | OUTPATIENT
Start: 2022-05-14 | End: 2022-05-17

## 2022-05-14 RX ORDER — POTASSIUM CHLORIDE 20 MEQ
40 PACKET (EA) ORAL EVERY 4 HOURS
Refills: 0 | Status: COMPLETED | OUTPATIENT
Start: 2022-05-14 | End: 2022-05-14

## 2022-05-14 RX ORDER — ATORVASTATIN CALCIUM 80 MG/1
10 TABLET, FILM COATED ORAL AT BEDTIME
Refills: 0 | Status: DISCONTINUED | OUTPATIENT
Start: 2022-05-14 | End: 2022-05-17

## 2022-05-14 RX ADMIN — Medication 40 MILLIEQUIVALENT(S): at 13:05

## 2022-05-14 RX ADMIN — MORPHINE SULFATE 2 MILLIGRAM(S): 50 CAPSULE, EXTENDED RELEASE ORAL at 21:12

## 2022-05-14 RX ADMIN — HYDROMORPHONE HYDROCHLORIDE 1 MILLIGRAM(S): 2 INJECTION INTRAMUSCULAR; INTRAVENOUS; SUBCUTANEOUS at 00:48

## 2022-05-14 RX ADMIN — Medication 650 MILLIGRAM(S): at 10:50

## 2022-05-14 RX ADMIN — ATORVASTATIN CALCIUM 10 MILLIGRAM(S): 80 TABLET, FILM COATED ORAL at 21:12

## 2022-05-14 RX ADMIN — SODIUM CHLORIDE 75 MILLILITER(S): 9 INJECTION INTRAMUSCULAR; INTRAVENOUS; SUBCUTANEOUS at 13:13

## 2022-05-14 RX ADMIN — MORPHINE SULFATE 4 MILLIGRAM(S): 50 CAPSULE, EXTENDED RELEASE ORAL at 03:43

## 2022-05-14 RX ADMIN — Medication 40 MILLIEQUIVALENT(S): at 08:55

## 2022-05-14 RX ADMIN — MORPHINE SULFATE 4 MILLIGRAM(S): 50 CAPSULE, EXTENDED RELEASE ORAL at 03:19

## 2022-05-14 RX ADMIN — HYDROMORPHONE HYDROCHLORIDE 1 MILLIGRAM(S): 2 INJECTION INTRAMUSCULAR; INTRAVENOUS; SUBCUTANEOUS at 03:43

## 2022-05-14 RX ADMIN — Medication 650 MILLIGRAM(S): at 09:49

## 2022-05-14 RX ADMIN — Medication 10 MILLIGRAM(S): at 03:19

## 2022-05-14 RX ADMIN — SODIUM CHLORIDE 75 MILLILITER(S): 9 INJECTION INTRAMUSCULAR; INTRAVENOUS; SUBCUTANEOUS at 06:01

## 2022-05-14 RX ADMIN — ONDANSETRON 4 MILLIGRAM(S): 8 TABLET, FILM COATED ORAL at 06:33

## 2022-05-14 RX ADMIN — Medication 25 GRAM(S): at 15:58

## 2022-05-14 RX ADMIN — Medication 88 MICROGRAM(S): at 06:32

## 2022-05-14 RX ADMIN — PANTOPRAZOLE SODIUM 40 MILLIGRAM(S): 20 TABLET, DELAYED RELEASE ORAL at 13:04

## 2022-05-14 RX ADMIN — SODIUM CHLORIDE 1000 MILLILITER(S): 9 INJECTION INTRAMUSCULAR; INTRAVENOUS; SUBCUTANEOUS at 03:44

## 2022-05-14 RX ADMIN — SODIUM CHLORIDE 1000 MILLILITER(S): 9 INJECTION INTRAMUSCULAR; INTRAVENOUS; SUBCUTANEOUS at 00:48

## 2022-05-14 RX ADMIN — MORPHINE SULFATE 2 MILLIGRAM(S): 50 CAPSULE, EXTENDED RELEASE ORAL at 21:42

## 2022-05-14 NOTE — H&P ADULT - PROBLEM SELECTOR PLAN 5
Hx of Hypothyroidism  home med - synthroid 88 mcg  resume home med Hx of HLD  home med - atorvastatin  resume home med

## 2022-05-14 NOTE — H&P ADULT - PROBLEM SELECTOR PLAN 6
Protonix IV for GI prophylaxis  Zofran prn x N/V Hx of Hypothyroidism  home med - synthroid 88 mcg  resume home med

## 2022-05-14 NOTE — H&P ADULT - PROBLEM SELECTOR PLAN 2
p/w WBC - 20  could be reactive vs. infection  CXR - neg  CT A/P - neg  f/u UA  monitor CBC Hx of Breast CA  on chemo (last chemo was 5/5/22)  home meds - Taxotere, Carboplatin, Herceptin, Perjeta, Ziextenzio  hold home meds in setting of N/V/D  [facility]-Onc (Dr. Encinas) consulted

## 2022-05-14 NOTE — H&P ADULT - NSHPREVIEWOFSYSTEMS_GEN_ALL_CORE
- CONSTITUTIONAL: Denies fever and chills  - HEENT: Denies changes in vision and hearing.  - RESPIRATORY: Denies SOB and cough.  - CV: Denies chest pain and palpitations  - GI: (+) abdominal pain, nausea, vomiting and diarrhea.  - : Denies dysuria and urinary frequency.  - SKIN: Denies rash and pruritus.  - NEUROLOGICAL: Denies headache and syncope.  - PSYCHIATRIC: Denies recent changes in mood. Denies anxiety and depression.

## 2022-05-14 NOTE — H&P ADULT - NSHPSOCIALHISTORY_GEN_ALL_CORE
with 3 daughter  lives with family  non-smoker  non-alcoholic beverage drinker  no illicit drug use

## 2022-05-14 NOTE — H&P ADULT - PROBLEM SELECTOR PLAN 3
p/w Hgb/Hct - 8.7/27.5  no signs of active bleeding  LMP - 5/5/22 (heavy lasting for 5 days  CT A/P - neg  f/u UA  monitor CBC p/w WBC - 20  could be reactive vs. infection vs. chemo (Ziextenzio)  CXR - neg  CT A/P - neg  f/u UA  monitor CBC

## 2022-05-14 NOTE — H&P ADULT - NSHPPHYSICALEXAM_GEN_ALL_CORE
Vital Signs  · Temp - 98.9F (37.2C)  · Heart Rate - 92  · BP - 117/70  · Respiration Rate - 18  · SpO2 (%) - 100    PHYSICAL EXAM:  GENERAL: NAD, speaks in full sentences, no signs of respiratory distress  HEAD:  Atraumatic, Normocephalic  EYES: EOMI, PERRLA, pale conjunctiva and sclera clear  NECK: Supple, No JVD  CHEST/LUNG: Clear to auscultation bilaterally; No wheeze; No crackles; No accessory muscles used  HEART: Regular rate and rhythm; No murmurs;   ABDOMEN: Soft, mild tenderness, Nondistended; Bowel sounds present; No guarding  EXTREMITIES:  2+ Peripheral Pulses, No cyanosis or edema  PSYCH: AAOx3  NEUROLOGY: non-focal  SKIN: No rashes or lesions; thinning of hair (chemotherapy)

## 2022-05-14 NOTE — H&P ADULT - PROBLEM SELECTOR PLAN 1
p/w abd pain  assoc N/V, diarrhea  infectious vs. chemo induced  WBC - 20 (elevated)  CBC - 8.7 (low)  CT A/P - neg  Clear Liquid for now  Protonix IV OD  Zofran prn x N/V  hold chemo drugs as in-patient

## 2022-05-14 NOTE — H&P ADULT - ATTENDING COMMENTS
48 y/o F w/ Hx of Breast CA (s/p bilateral mastectomy, s/p L sentinel LN biopsy), last chemo was May 5, 2022, HTN, HLD, Hypothyroidism. She came to the ED due to onset of severe intermittent cramping non-radiating generalized abdominal pain. It is associated with nausea, non-billous, non-bloody vomiting, heartburn, loose watery diarrhea. This started after she was eating soup in the morning. There was no fever, cough, chest pain, shortness of breath, palpitation.     < from: CT Abdomen and Pelvis w/ Oral Cont (05.14.22 @ 01:57) >    MPRESSION:    No acute finding.    < end of copied text >        assessment  -- acute gastroenteritis poss 2nd to chemo tx, r/o c diff, h/o Breast CA (s/p bilateral mastectomy, s/p L sentinel LN biopsy), last chemo was May 5, 2022, HTN, HLD, Hypothyroidism    plan  --  admit to med, zofran, cont preadmit home meds, gi and dvt prophylaxis, ivf   cbc, bmp, mg, phos, lipids, tsh, bld cx, ua, ucx, stool studies    gi cons  heme onc cons 48 y/o F w/ Hx of Breast CA (s/p bilateral mastectomy, s/p L sentinel LN biopsy), last chemo was May 5, 2022, HTN, HLD, Hypothyroidism. She came to the ED due to onset of severe intermittent cramping non-radiating generalized abdominal pain. It is associated with nausea, non-billous, non-bloody vomiting, heartburn, loose watery diarrhea. This started after she was eating soup in the morning. There was no fever, cough, chest pain, shortness of breath, palpitation.     < from: CT Abdomen and Pelvis w/ Oral Cont (05.14.22 @ 01:57) >    MPRESSION:    No acute finding.    < end of copied text >        assessment  -- acute gastroenteritis poss 2nd to chemo tx, r/o c diff, h/o Breast CA (s/p bilateral mastectomy, s/p L sentinel LN biopsy), last chemo was May 5, 2022, HTN, HLD, Hypothyroidism    plan  --  admit to med, zofran, supplement potassium prn for hypokalemia, cont preadmit home meds, gi and dvt prophylaxis, ivf   cbc, bmp, mg, phos, lipids, tsh, bld cx, ua, ucx, stool studies    gi cons  heme onc cons

## 2022-05-14 NOTE — H&P ADULT - ASSESSMENT
Patient is a 46 y/o F w/ Hx of Breast CA (s/p bilateral mastectomy, s/p L sentinel LN biopsy), last chemo was May 5, 2022, HTN, HLD, Hypothyroidism. Admitted for abdominal pain 2/2 acute gastroenteritis Patient is a 48 y/o F w/ Hx of Breast CA (s/p bilateral mastectomy, s/p L sentinel LN biopsy), last chemo was May 5, 2022, HTN, HLD, Hypothyroidism. Admitted for abdominal pain 2/2 acute gastroenteritis    PRIMARY TEAM TO CONFIRM CHEMO MED DOSAGES IN THE MORNING

## 2022-05-14 NOTE — H&P ADULT - HISTORY OF PRESENT ILLNESS
Patient is a 46 y/o F w/ Hx of Breast CA (s/p bilateral mastectomy, s/p L sentinel LN biopsy), last chemo was May 5, 2022, HTN, HLD, Hypothyroidism. She came to the ED due to onset of severe intermittent cramping non-radiating generalized abdominal pain. It is associated with nausea, non-billous, non-bloody vomiting, heartburn, loose watery diarrhea. This started after she was eating soup in the morning. There was no fever, cough, chest pain, shortness of breath, palpitation.    GOC: FULL CODE

## 2022-05-14 NOTE — CONSULT NOTE ADULT - SUBJECTIVE AND OBJECTIVE BOX
Reason for consult:    HPI:  Patient is a 46 y/o F w/ Hx of Breast CA (s/p bilateral mastectomy, s/p L sentinel LN biopsy), last chemo was May 5, 2022, HTN, HLD, Hypothyroidism. She came to the ED due to onset of severe intermittent cramping non-radiating generalized abdominal pain. It is associated with nausea, non-billous, non-bloody vomiting, heartburn, loose watery diarrhea. This started after she was eating soup in the morning. There was no fever, cough, chest pain, shortness of breath, palpitation.    GOC: FULL CODE (14 May 2022 05:17)      PAST MEDICAL & SURGICAL HISTORY:  Hypothyroidism      History of loop recorder  palpitations      Endometriosis  fibroids      Anemia      Migraines      HLD (hyperlipidemia)      Breast cyst, right       delivery delivered  x3      History of varicose veins      H/O bilateral mastectomy          FAMILY HISTORY:      Alochol: Denied  Smoking: Nonsmoker  Drug Use: Denied  Marital Status:         Allergies    No Known Allergies    Intolerances        MEDICATIONS  (STANDING):  atorvastatin 10 milliGRAM(s) Oral at bedtime  levothyroxine 88 MICROGram(s) Oral daily  pantoprazole  Injectable 40 milliGRAM(s) IV Push daily  sodium chloride 0.9%. 1000 milliLiter(s) (75 mL/Hr) IV Continuous <Continuous>    MEDICATIONS  (PRN):  aluminum hydroxide/magnesium hydroxide/simethicone Suspension 30 milliLiter(s) Oral every 6 hours PRN Dyspepsia  ondansetron Injectable 4 milliGRAM(s) IV Push every 8 hours PRN Nausea and/or Vomiting      ROS:     General:  No wt loss, fevers, chills, night sweats, fatigue,   Eyes:  Good vision, no reported pain  ENT:  No sore throat, pain, runny nose, dysphagia  CV:  No pain, palpitations, hypo/hypertension  Resp:  No dyspnea, cough, tachypnea, wheezing  GI:  See HPI   :  No pain, bleeding, incontinence, nocturia  Muscle:  No pain, weakness  Neuro:  No weakness, tingling, memory problems  Psych:  No fatigue, insomnia, mood problems, depression  Endocrine:  No polyuria, polydipsia, cold/heat intolerance  Heme:  No petechiae, ecchymosis, easy bruisability  Skin:  No rash, tattoos, scars, edema      PHYSICAL EXAM:     GENERAL:  Appears stated age, well-groomed  HEENT:  NC/AT,  conjunctivae clear and pink  CHEST:  Full & symmetric excursion, no increased effort, breath sounds clear  HEART:  Regular rhythm, S1, S2, no murmur/rub/S3/S4  ABDOMEN:  Soft, non-tender, non-distended  EXTEREMITIES:  no cyanosis,clubbing or edema  SKIN:  No rash/erythema/ecchymoses  NEURO:  Alert, oriented, no asterixis  LN: no palpable Lymphadenopathy                           8.4    16.12 )-----------( 247      ( 14 May 2022 10:58 )             26.2       05-14    139  |  110<H>  |  11  ----------------------------<  109<H>  3.3<L>   |  22  |  0.53    Ca    7.9<L>      14 May 2022 10:58  Phos  3.5       Mg     1.5         TPro  5.9<L>  /  Alb  2.7<L>  /  TBili  0.3  /  DBili  x   /  AST  29  /  ALT  47  /  AlkPhos  116

## 2022-05-14 NOTE — H&P ADULT - PROBLEM SELECTOR PLAN 4
Hx of HLD  home med - atorvastatin  resume home med p/w Hgb/Hct - 8.7/27.5  no signs of active bleeding  LMP - 5/5/22 (heavy lasting for 5 days  CT A/P - neg  f/u UA  monitor CBC

## 2022-05-15 LAB
ALBUMIN SERPL ELPH-MCNC: 2.5 G/DL — LOW (ref 3.5–5)
ALP SERPL-CCNC: 94 U/L — SIGNIFICANT CHANGE UP (ref 40–120)
ALT FLD-CCNC: 41 U/L DA — SIGNIFICANT CHANGE UP (ref 10–60)
ANION GAP SERPL CALC-SCNC: 6 MMOL/L — SIGNIFICANT CHANGE UP (ref 5–17)
AST SERPL-CCNC: 30 U/L — SIGNIFICANT CHANGE UP (ref 10–40)
BASOPHILS # BLD AUTO: 0.04 K/UL — SIGNIFICANT CHANGE UP (ref 0–0.2)
BASOPHILS NFR BLD AUTO: 0.4 % — SIGNIFICANT CHANGE UP (ref 0–2)
BILIRUB DIRECT SERPL-MCNC: <0.1 MG/DL — SIGNIFICANT CHANGE UP (ref 0–0.3)
BILIRUB SERPL-MCNC: 0.3 MG/DL — SIGNIFICANT CHANGE UP (ref 0.2–1.2)
BUN SERPL-MCNC: 3 MG/DL — LOW (ref 7–18)
C DIFF BY PCR RESULT: SIGNIFICANT CHANGE UP
C DIFF TOX GENS STL QL NAA+PROBE: SIGNIFICANT CHANGE UP
CALCIUM SERPL-MCNC: 7.8 MG/DL — LOW (ref 8.4–10.5)
CHLORIDE SERPL-SCNC: 114 MMOL/L — HIGH (ref 96–108)
CO2 SERPL-SCNC: 24 MMOL/L — SIGNIFICANT CHANGE UP (ref 22–31)
CREAT SERPL-MCNC: 0.54 MG/DL — SIGNIFICANT CHANGE UP (ref 0.5–1.3)
EGFR: 116 ML/MIN/1.73M2 — SIGNIFICANT CHANGE UP
EOSINOPHIL # BLD AUTO: 0.02 K/UL — SIGNIFICANT CHANGE UP (ref 0–0.5)
EOSINOPHIL NFR BLD AUTO: 0.2 % — SIGNIFICANT CHANGE UP (ref 0–6)
GLUCOSE SERPL-MCNC: 86 MG/DL — SIGNIFICANT CHANGE UP (ref 70–99)
HCT VFR BLD CALC: 24.3 % — LOW (ref 34.5–45)
HGB BLD-MCNC: 7.7 G/DL — LOW (ref 11.5–15.5)
IMM GRANULOCYTES NFR BLD AUTO: 13.1 % — HIGH (ref 0–1.5)
LYMPHOCYTES # BLD AUTO: 1.56 K/UL — SIGNIFICANT CHANGE UP (ref 1–3.3)
LYMPHOCYTES # BLD AUTO: 15.1 % — SIGNIFICANT CHANGE UP (ref 13–44)
MAGNESIUM SERPL-MCNC: 2.1 MG/DL — SIGNIFICANT CHANGE UP (ref 1.6–2.6)
MCHC RBC-ENTMCNC: 28.1 PG — SIGNIFICANT CHANGE UP (ref 27–34)
MCHC RBC-ENTMCNC: 31.7 GM/DL — LOW (ref 32–36)
MCV RBC AUTO: 88.7 FL — SIGNIFICANT CHANGE UP (ref 80–100)
MONOCYTES # BLD AUTO: 0.93 K/UL — HIGH (ref 0–0.9)
MONOCYTES NFR BLD AUTO: 9 % — SIGNIFICANT CHANGE UP (ref 2–14)
NEUTROPHILS # BLD AUTO: 6.4 K/UL — SIGNIFICANT CHANGE UP (ref 1.8–7.4)
NEUTROPHILS NFR BLD AUTO: 62.2 % — SIGNIFICANT CHANGE UP (ref 43–77)
NRBC # BLD: 0 /100 WBCS — SIGNIFICANT CHANGE UP (ref 0–0)
PHOSPHATE SERPL-MCNC: 3.1 MG/DL — SIGNIFICANT CHANGE UP (ref 2.5–4.5)
PLATELET # BLD AUTO: 218 K/UL — SIGNIFICANT CHANGE UP (ref 150–400)
POTASSIUM SERPL-MCNC: 3.7 MMOL/L — SIGNIFICANT CHANGE UP (ref 3.5–5.3)
POTASSIUM SERPL-SCNC: 3.7 MMOL/L — SIGNIFICANT CHANGE UP (ref 3.5–5.3)
PROT SERPL-MCNC: 5.4 G/DL — LOW (ref 6–8.3)
RBC # BLD: 2.74 M/UL — LOW (ref 3.8–5.2)
RBC # FLD: 16.4 % — HIGH (ref 10.3–14.5)
SODIUM SERPL-SCNC: 144 MMOL/L — SIGNIFICANT CHANGE UP (ref 135–145)
WBC # BLD: 10.3 K/UL — SIGNIFICANT CHANGE UP (ref 3.8–10.5)
WBC # FLD AUTO: 10.3 K/UL — SIGNIFICANT CHANGE UP (ref 3.8–10.5)

## 2022-05-15 RX ORDER — SIMETHICONE 80 MG/1
80 TABLET, CHEWABLE ORAL
Refills: 0 | Status: DISCONTINUED | OUTPATIENT
Start: 2022-05-15 | End: 2022-05-17

## 2022-05-15 RX ORDER — SODIUM CHLORIDE 9 MG/ML
1000 INJECTION INTRAMUSCULAR; INTRAVENOUS; SUBCUTANEOUS ONCE
Refills: 0 | Status: COMPLETED | OUTPATIENT
Start: 2022-05-15 | End: 2022-05-15

## 2022-05-15 RX ADMIN — Medication 88 MICROGRAM(S): at 05:06

## 2022-05-15 RX ADMIN — SODIUM CHLORIDE 75 MILLILITER(S): 9 INJECTION INTRAMUSCULAR; INTRAVENOUS; SUBCUTANEOUS at 05:06

## 2022-05-15 RX ADMIN — ATORVASTATIN CALCIUM 10 MILLIGRAM(S): 80 TABLET, FILM COATED ORAL at 21:36

## 2022-05-15 RX ADMIN — SODIUM CHLORIDE 1000 MILLILITER(S): 9 INJECTION INTRAMUSCULAR; INTRAVENOUS; SUBCUTANEOUS at 08:51

## 2022-05-15 RX ADMIN — Medication 10 MILLIGRAM(S): at 21:36

## 2022-05-15 RX ADMIN — PANTOPRAZOLE SODIUM 40 MILLIGRAM(S): 20 TABLET, DELAYED RELEASE ORAL at 12:02

## 2022-05-15 NOTE — PROGRESS NOTE ADULT - SUBJECTIVE AND OBJECTIVE BOX
Patient is a 47y old  Female who presents with a chief complaint of gastroenteritis (14 May 2022 23:19)    pt seen in icu [  ], reg med floor [   ], bed [  ], chair at bedside [   ], a+o x3 [  ], lethargic [  ],  nad [  ]    saenz [  ], ngt [  ], peg [  ], et tube [  ], cent line [  ], picc line [  ]        Allergies    No Known Allergies        Vitals    T(F): 98.5 (05-15-22 @ 05:05), Max: 98.6 (22 @ 13:49)  HR: 82 (05-15-22 @ 05:05) (82 - 100)  BP: 99/63 (05-15-22 @ 05:05) (99/63 - 125/63)  RR: 18 (05-15-22 @ 05:05) (18 - 18)  SpO2: 100% (05-15-22 @ 05:05) (98% - 100%)  Wt(kg): --  CAPILLARY BLOOD GLUCOSE          Labs                          7.7    10.30 )-----------( 218      ( 15 May 2022 06:05 )             24.3       05-14    139  |  110<H>  |  11  ----------------------------<  109<H>  3.3<L>   |  22  |  0.53    Ca    7.9<L>      14 May 2022 10:58  Phos  3.5       Mg     1.5         TPro  5.9<L>  /  Alb  2.7<L>  /  TBili  0.3  /  DBili  x   /  AST  29  /  ALT  47  /  AlkPhos  116                  Radiology Results      Meds    MEDICATIONS  (STANDING):  atorvastatin 10 milliGRAM(s) Oral at bedtime  levothyroxine 88 MICROGram(s) Oral daily  pantoprazole  Injectable 40 milliGRAM(s) IV Push daily  sodium chloride 0.9%. 1000 milliLiter(s) (75 mL/Hr) IV Continuous <Continuous>      MEDICATIONS  (PRN):  aluminum hydroxide/magnesium hydroxide/simethicone Suspension 30 milliLiter(s) Oral every 6 hours PRN Dyspepsia  ondansetron Injectable 4 milliGRAM(s) IV Push every 8 hours PRN Nausea and/or Vomiting      Physical Exam    Neuro :  no focal deficits  Respiratory: CTA B/L  CV: RRR, S1S2, no murmurs,   Abdominal: Soft, NT, ND +BS,  Extremities: No edema, + peripheral pulses    ASSESSMENT    acute gastroenteritis poss 2nd to chemo tx,   r/o c diff,   h/o Breast CA (s/p bilateral mastectomy, s/p L sentinel LN biopsy),   last chemo was May 5, 2022,   HTN,   HLD,   Hypothyroidism    Abdominal pain    Hypothyroidism    History of loop recorder    Endometriosis    Anemia    Migraines    HLD (hyperlipidemia)    Breast cyst, right     delivery delivered    History of varicose veins    H/O bilateral mastectomy        PLAN    admit to med,   zofran,   supplement potassium prn for hypokalemia,   cont preadmit home meds,   gi   dvt prophylaxis,   ivf   cbc,   bmp,   mg,   phos,   lipids,   tsh,   bld cx,   ua,   ucx,   stool studies    gi cons  heme onc cons.          Patient is a 47y old  Female who presents with a chief complaint of gastroenteritis (14 May 2022 23:19)    pt seen in icu [  ], reg med floor [  x ], bed [ x ], chair at bedside [   ], a+o x3 [x  ], lethargic [  ],  nad [ x ]    Allergies    No Known Allergies        Vitals    T(F): 98.5 (05-15-22 @ 05:05), Max: 98.6 (05-14-22 @ 13:49)  HR: 82 (05-15-22 @ 05:05) (82 - 100)  BP: 99/63 (05-15-22 @ 05:05) (99/63 - 125/63)  RR: 18 (05-15-22 @ 05:05) (18 - 18)  SpO2: 100% (05-15-22 @ 05:05) (98% - 100%)  Wt(kg): --  CAPILLARY BLOOD GLUCOSE          Labs                          7.7    10.30 )-----------( 218      ( 15 May 2022 06:05 )             24.3       05-14    139  |  110<H>  |  11  ----------------------------<  109<H>  3.3<L>   |  22  |  0.53    Ca    7.9<L>      14 May 2022 10:58  Phos  3.5     05-14  Mg     1.5     05-14    TPro  5.9<L>  /  Alb  2.7<L>  /  TBili  0.3  /  DBili  x   /  AST  29  /  ALT  47  /  AlkPhos  116  05-14                Radiology Results      Meds    MEDICATIONS  (STANDING):  atorvastatin 10 milliGRAM(s) Oral at bedtime  levothyroxine 88 MICROGram(s) Oral daily  pantoprazole  Injectable 40 milliGRAM(s) IV Push daily  sodium chloride 0.9%. 1000 milliLiter(s) (75 mL/Hr) IV Continuous <Continuous>      MEDICATIONS  (PRN):  aluminum hydroxide/magnesium hydroxide/simethicone Suspension 30 milliLiter(s) Oral every 6 hours PRN Dyspepsia  ondansetron Injectable 4 milliGRAM(s) IV Push every 8 hours PRN Nausea and/or Vomiting      Physical Exam    Neuro :  no focal deficits  Respiratory: CTA B/L  CV: RRR, S1S2, no murmurs,   Abdominal: Soft, NT, ND +BS,  Extremities: No edema, + peripheral pulses    ASSESSMENT    acute gastroenteritis poss 2nd to chemo tx,   r/o c diff,   h/o Breast CA (s/p bilateral mastectomy, s/p L sentinel LN biopsy),   last chemo was May 5, 2022,   HTN,   HLD,   Hypothyroidism  loop recorder  Endometriosis  Anemia  Migraines      PLAN    cont zofran,   supplement potassium prn for hypokalemia,   gi cons  cont ivf   obtain stool studies if pt has diarrhea  wbc wnl   transfuse prbc for hgb <7  f/u bmp,   heme onc f/u  cont current meds.        Patient is a 47y old  Female who presents with a chief complaint of gastroenteritis (14 May 2022 23:19)    pt seen in icu [  ], reg med floor [  x ], bed [ x ], chair at bedside [   ], a+o x3 [x  ], lethargic [  ],  nad [ x ]    Allergies    No Known Allergies        Vitals    T(F): 98.5 (05-15-22 @ 05:05), Max: 98.6 (05-14-22 @ 13:49)  HR: 82 (05-15-22 @ 05:05) (82 - 100)  BP: 99/63 (05-15-22 @ 05:05) (99/63 - 125/63)  RR: 18 (05-15-22 @ 05:05) (18 - 18)  SpO2: 100% (05-15-22 @ 05:05) (98% - 100%)  Wt(kg): --  CAPILLARY BLOOD GLUCOSE          Labs                          7.7    10.30 )-----------( 218      ( 15 May 2022 06:05 )             24.3       05-14    139  |  110<H>  |  11  ----------------------------<  109<H>  3.3<L>   |  22  |  0.53    Ca    7.9<L>      14 May 2022 10:58  Phos  3.5     05-14  Mg     1.5     05-14    TPro  5.9<L>  /  Alb  2.7<L>  /  TBili  0.3  /  DBili  x   /  AST  29  /  ALT  47  /  AlkPhos  116  05-14                Radiology Results      Meds    MEDICATIONS  (STANDING):  atorvastatin 10 milliGRAM(s) Oral at bedtime  levothyroxine 88 MICROGram(s) Oral daily  pantoprazole  Injectable 40 milliGRAM(s) IV Push daily  sodium chloride 0.9%. 1000 milliLiter(s) (75 mL/Hr) IV Continuous <Continuous>      MEDICATIONS  (PRN):  aluminum hydroxide/magnesium hydroxide/simethicone Suspension 30 milliLiter(s) Oral every 6 hours PRN Dyspepsia  ondansetron Injectable 4 milliGRAM(s) IV Push every 8 hours PRN Nausea and/or Vomiting      Physical Exam    Neuro :  no focal deficits  Respiratory: CTA B/L  CV: RRR, S1S2, no murmurs,   Abdominal: Soft, NT, ND +BS,  Extremities: No edema, + peripheral pulses    ASSESSMENT    acute gastroenteritis poss 2nd to chemo tx,   r/o c diff,   h/o Breast CA (s/p bilateral mastectomy, s/p L sentinel LN biopsy),   last chemo was May 5, 2022,   HTN,   HLD,   Hypothyroidism  loop recorder  Endometriosis  Anemia  Migraines      PLAN    cont zofran,   gi cons  cont ivf   obtain stool studies if pt has diarrhea  wbc wnl   f/u stool occult blood  transfuse prbc for hgb <7   f/u bmp,   supplement potassium prn for hypokalemia,   heme onc f/u  cont current meds.

## 2022-05-16 DIAGNOSIS — Z02.9 ENCOUNTER FOR ADMINISTRATIVE EXAMINATIONS, UNSPECIFIED: ICD-10-CM

## 2022-05-16 LAB
ALBUMIN SERPL ELPH-MCNC: 2.8 G/DL — LOW (ref 3.5–5)
ALP SERPL-CCNC: 88 U/L — SIGNIFICANT CHANGE UP (ref 40–120)
ALT FLD-CCNC: 39 U/L DA — SIGNIFICANT CHANGE UP (ref 10–60)
ANION GAP SERPL CALC-SCNC: 6 MMOL/L — SIGNIFICANT CHANGE UP (ref 5–17)
AST SERPL-CCNC: 28 U/L — SIGNIFICANT CHANGE UP (ref 10–40)
BASOPHILS # BLD AUTO: 0.05 K/UL — SIGNIFICANT CHANGE UP (ref 0–0.2)
BASOPHILS NFR BLD AUTO: 0.5 % — SIGNIFICANT CHANGE UP (ref 0–2)
BILIRUB DIRECT SERPL-MCNC: <0.1 MG/DL — SIGNIFICANT CHANGE UP (ref 0–0.3)
BILIRUB SERPL-MCNC: 0.3 MG/DL — SIGNIFICANT CHANGE UP (ref 0.2–1.2)
BLD GP AB SCN SERPL QL: SIGNIFICANT CHANGE UP
BUN SERPL-MCNC: 3 MG/DL — LOW (ref 7–18)
CALCIUM SERPL-MCNC: 8.9 MG/DL — SIGNIFICANT CHANGE UP (ref 8.4–10.5)
CHLORIDE SERPL-SCNC: 111 MMOL/L — HIGH (ref 96–108)
CO2 SERPL-SCNC: 26 MMOL/L — SIGNIFICANT CHANGE UP (ref 22–31)
CREAT SERPL-MCNC: 0.61 MG/DL — SIGNIFICANT CHANGE UP (ref 0.5–1.3)
EGFR: 112 ML/MIN/1.73M2 — SIGNIFICANT CHANGE UP
EOSINOPHIL # BLD AUTO: 0.02 K/UL — SIGNIFICANT CHANGE UP (ref 0–0.5)
EOSINOPHIL NFR BLD AUTO: 0.2 % — SIGNIFICANT CHANGE UP (ref 0–6)
GLUCOSE SERPL-MCNC: 93 MG/DL — SIGNIFICANT CHANGE UP (ref 70–99)
HCT VFR BLD CALC: 26.1 % — LOW (ref 34.5–45)
HGB BLD-MCNC: 8.3 G/DL — LOW (ref 11.5–15.5)
IMM GRANULOCYTES NFR BLD AUTO: 7.7 % — HIGH (ref 0–1.5)
LYMPHOCYTES # BLD AUTO: 1.47 K/UL — SIGNIFICANT CHANGE UP (ref 1–3.3)
LYMPHOCYTES # BLD AUTO: 15.6 % — SIGNIFICANT CHANGE UP (ref 13–44)
MAGNESIUM SERPL-MCNC: 2.1 MG/DL — SIGNIFICANT CHANGE UP (ref 1.6–2.6)
MCHC RBC-ENTMCNC: 27.9 PG — SIGNIFICANT CHANGE UP (ref 27–34)
MCHC RBC-ENTMCNC: 31.8 GM/DL — LOW (ref 32–36)
MCV RBC AUTO: 87.9 FL — SIGNIFICANT CHANGE UP (ref 80–100)
MONOCYTES # BLD AUTO: 0.84 K/UL — SIGNIFICANT CHANGE UP (ref 0–0.9)
MONOCYTES NFR BLD AUTO: 8.9 % — SIGNIFICANT CHANGE UP (ref 2–14)
NEUTROPHILS # BLD AUTO: 6.33 K/UL — SIGNIFICANT CHANGE UP (ref 1.8–7.4)
NEUTROPHILS NFR BLD AUTO: 67.1 % — SIGNIFICANT CHANGE UP (ref 43–77)
NRBC # BLD: 0 /100 WBCS — SIGNIFICANT CHANGE UP (ref 0–0)
PHOSPHATE SERPL-MCNC: 3.9 MG/DL — SIGNIFICANT CHANGE UP (ref 2.5–4.5)
PLATELET # BLD AUTO: 223 K/UL — SIGNIFICANT CHANGE UP (ref 150–400)
POTASSIUM SERPL-MCNC: 3.9 MMOL/L — SIGNIFICANT CHANGE UP (ref 3.5–5.3)
POTASSIUM SERPL-SCNC: 3.9 MMOL/L — SIGNIFICANT CHANGE UP (ref 3.5–5.3)
PROT SERPL-MCNC: 5.6 G/DL — LOW (ref 6–8.3)
RBC # BLD: 2.97 M/UL — LOW (ref 3.8–5.2)
RBC # FLD: 15.9 % — HIGH (ref 10.3–14.5)
SODIUM SERPL-SCNC: 143 MMOL/L — SIGNIFICANT CHANGE UP (ref 135–145)
WBC # BLD: 9.44 K/UL — SIGNIFICANT CHANGE UP (ref 3.8–10.5)
WBC # FLD AUTO: 9.44 K/UL — SIGNIFICANT CHANGE UP (ref 3.8–10.5)

## 2022-05-16 RX ADMIN — Medication 10 MILLIGRAM(S): at 18:42

## 2022-05-16 RX ADMIN — Medication 88 MICROGRAM(S): at 06:22

## 2022-05-16 RX ADMIN — Medication 10 MILLIGRAM(S): at 06:22

## 2022-05-16 RX ADMIN — ATORVASTATIN CALCIUM 10 MILLIGRAM(S): 80 TABLET, FILM COATED ORAL at 21:42

## 2022-05-16 RX ADMIN — PANTOPRAZOLE SODIUM 40 MILLIGRAM(S): 20 TABLET, DELAYED RELEASE ORAL at 12:28

## 2022-05-16 NOTE — PROGRESS NOTE ADULT - SUBJECTIVE AND OBJECTIVE BOX
Patient is a 47y old  Female who presents with a chief complaint of gastroenteritis (16 May 2022 09:27)    INTERVAL HPI/OVERNIGHT EVENTS: no acute envents overnight    REVIEW OF SYSTEMS:  CONSTITUTIONAL: No fever, chills  ENMT:  No difficulty hearing, no change in vision  NECK: No pain or stiffness  RESPIRATORY: No cough, SOB  CARDIOVASCULAR: No chest pain, palpitations  GASTROINTESTINAL: No abdominal pain. No nausea, vomiting, or diarrhea  GENITOURINARY: No dysuria  NEUROLOGICAL: No HA  SKIN: No itching, burning, rashes, or lesions   LYMPH NODES: No enlarged glands  ENDOCRINE: No heat or cold intolerance; No hair loss  MUSCULOSKELETAL: No joint pain or swelling; No muscle, back, or extremity pain  PSYCHIATRIC: No depression, anxiety  HEME/LYMPH: No easy bruising, or bleeding gums    T(C): 37 (05-16-22 @ 13:29), Max: 37.1 (05-15-22 @ 21:39)  HR: 86 (05-16-22 @ 13:29) (63 - 86)  BP: 107/69 (05-16-22 @ 13:29) (101/67 - 107/69)  RR: 16 (05-16-22 @ 13:29) (16 - 18)  SpO2: 97% (05-16-22 @ 13:29) (97% - 100%)  Wt(kg): --Vital Signs Last 24 Hrs  T(C): 37 (16 May 2022 13:29), Max: 37.1 (15 May 2022 21:39)  T(F): 98.6 (16 May 2022 13:29), Max: 98.8 (15 May 2022 21:39)  HR: 86 (16 May 2022 13:29) (63 - 86)  BP: 107/69 (16 May 2022 13:29) (101/67 - 107/69)  BP(mean): 81 (15 May 2022 21:39) (81 - 81)  RR: 16 (16 May 2022 13:29) (16 - 18)  SpO2: 97% (16 May 2022 13:29) (97% - 100%)  MEDICATIONS  (STANDING):  atorvastatin 10 milliGRAM(s) Oral at bedtime  levothyroxine 88 MICROGram(s) Oral daily  pantoprazole  Injectable 40 milliGRAM(s) IV Push daily  sodium chloride 0.9%. 1000 milliLiter(s) (75 mL/Hr) IV Continuous <Continuous>    MEDICATIONS  (PRN):  aluminum hydroxide/magnesium hydroxide/simethicone Suspension 30 milliLiter(s) Oral every 6 hours PRN Dyspepsia  dicyclomine 10 milliGRAM(s) Oral four times a day before meals PRN upset stomach  ondansetron Injectable 4 milliGRAM(s) IV Push every 8 hours PRN Nausea and/or Vomiting  simethicone 80 milliGRAM(s) Chew four times a day PRN Upset Stomach      PHYSICAL EXAM:  GENERAL: NAD  EYES: clear conjunctiva; EOMI  ENMT: Moist mucous membranes  NECK: Supple, No JVD, Normal thyroid  CHEST/LUNG: Clear to auscultation bilaterally; No rales, rhonchi, wheezing, or rubs  HEART: S1, S2, Regular rate and rhythm  ABDOMEN: Soft, Nontender, Nondistended; Bowel sounds present  NEURO: Alert & Oriented X3  EXTREMITIES: No LE edema, no calf tenderness  LYMPH: No lymphadenopathy noted  SKIN: No rashes or lesions    Consultant(s) Notes Reviewed:  [x ] YES  [ ] NO  Care Discussed with Consultants/Other Providers [ x] YES  [ ] NO    LABS:                        8.3    9.44  )-----------( 223      ( 16 May 2022 07:30 )             26.1     05-16    143  |  111<H>  |  3<L>  ----------------------------<  93  3.9   |  26  |  0.61    Ca    8.9      16 May 2022 07:30  Phos  3.9     05-16  Mg     2.1     05-16    TPro  5.6<L>  /  Alb  2.8<L>  /  TBili  0.3  /  DBili  <0.1  /  AST  28  /  ALT  39  /  AlkPhos  88  05-16      CAPILLARY BLOOD GLUCOSE    RADIOLOGY & ADDITIONAL TESTS:    Imaging Personally Reviewed:  [x ] YES  [ ] NO  < from: Xray Chest 1 View-PORTABLE IMMEDIATE (Xray Chest 1 View-PORTABLE IMMEDIATE .) (05.14.22 @ 03:23) >  ACC: [id number] EXAM:  XR CHEST PORTABLE IMMED 1V                          PROCEDURE DATE:  05/14/2022          INTERPRETATION:  Chest radiograph (one view)     CPT 26601    CLINICAL INFORMATION:  Cough.  Short of breath.    TECHNIQUE:  Single frontal view of the chest was obtained.    FINDINGS:  Prior study dated 4/7/2022 was available for review.    The lungs are clear.  No pleural abnormality is seen.    The heart and mediastinum appear intact.  Right-sided Port-A-Cath   catheter seen with thedistal tip overlying the superior vena cava. A   loop recorder device is seen overlying the left lower chest.      IMPRESSION: No evidence of active chest disease.    --- End of Report ---              < end of copied text >  < from: CT Abdomen and Pelvis w/ Oral Cont (05.14.22 @ 01:57) >  ACC: [id number] EXAM:  CT ABDOMEN AND PELVIS OC                          PROCEDURE DATE:  05/14/2022          INTERPRETATION:  CLINICAL INFORMATION: Abdominal pain    COMPARISON: CT abdomen pelvis 10/25/2012.    CONTRAST/COMPLICATIONS:  IV Contrast: NONE  Oral Contrast: Gastroview  Complications: None reported at time of study completion    PROCEDURE:  CT of the Abdomen and Pelvis was performed.  Sagittal and coronal reformats were performed.    FINDINGS:  LOWER CHEST: Within normal limits.    LIVER: Within normal limits.  BILE DUCTS: Normal caliber.  GALLBLADDER: Within normal limits.  SPLEEN: Within normal limits.  PANCREAS: Within normal limits.  ADRENALS: Within normal limits.  KIDNEYS/URETERS: Within normal limits.    BLADDER: Within normal limits.  REPRODUCTIVE ORGANS: Uterus appears enlarged. Limited evaluation without   intravenous contrast.    BOWEL: No bowel obstruction. Appendix is normal.  PERITONEUM: Trace fluid in the pelvis likely physiologic.  VESSELS: Within normal limits.  RETROPERITONEUM/LYMPH NODES: No lymphadenopathy.  ABDOMINAL WALL: Tiny fat-containing umbilical hernia. Probable injection   granuloma in the right posterior subcutaneous tissues of the buttocks.  BONES: Within normal limits.    IMPRESSION:    No acute finding.      --- End of Report ---            NAKITA MORFIN MD; Attending Radiologist  This document has been electronically signed. May 14 2022  2:23AM    < end of copied text >

## 2022-05-16 NOTE — PROGRESS NOTE ADULT - ASSESSMENT
NADYA REYES is a 47y Female who presents with a chief complaint of gastroenteritis.    Breast Cancer, HER2+/HR-  - Patient follows with Dr. Frankie Montoya.  - She is currently on adjuvant treatment with docetaxel + cyclophosphamide + trastuzumab + pertuzumab. Last dose given on May 5th.  - No treatment while inpatient or in rehabilitation.    Nausea/Vomiting  - Noted workup thus far.  - Continue IVF and advance diet as tolerated.  - Ondansetron as needed for nausea.    Will continue to follow.    Goe Guevara MD  Hematology/Oncology  C: [phone]  O: [phone] NADYA REYES is a 47y Female who presents with a chief complaint of gastroenteritis.    Breast Cancer, HER2+/HR-  - Patient follows with Dr. Frankie Montoya.  - She is currently on adjuvant treatment with docetaxel + cyclophosphamide + trastuzumab + pertuzumab. Last dose given on May 5th.  - No treatment while inpatient or in rehabilitation.    Nausea/Vomiting  - Noted workup thus far. Stool test pending.  - Continue IVF and advance diet as tolerated.  - Ondansetron as needed for nausea.    Will continue to follow.    Geo Guevara MD  Hematology/Oncology  O: [phone]

## 2022-05-16 NOTE — PROGRESS NOTE ADULT - ASSESSMENT
Patient is a 48 y/o F w/ Hx of Breast CA (s/p bilateral mastectomy, s/p L sentinel LN biopsy), last chemo was May 5, 2022, HTN, HLD, Hypothyroidism. Admitted for abdominal pain and diarrhea  likely 2/2 acute gastroenteritis in the setting of chemo. less likely infectious source. Heme/onc following. CXR unremarkable. Ct abd, no acute findings. Started on IVF and advance diet as tolerated.

## 2022-05-16 NOTE — CHART NOTE - NSCHARTNOTEFT_GEN_A_CORE
EVENT: Called by RN because of patient complaint of abd pain.    OBJECTIVE:  Vital Signs Last 24 Hrs  T(C): 36.7 (15 May 2022 07:58), Max: 37 (14 May 2022 13:49)  T(F): 98 (15 May 2022 07:58), Max: 98.6 (14 May 2022 13:49)  HR: 83 (15 May 2022 07:58) (82 - 100)  BP: 96/62 (15 May 2022 07:58) (96/62 - 125/63)  BP(mean): 66 (14 May 2022 20:44) (66 - 66)  RR: 18 (15 May 2022 07:58) (18 - 18)  SpO2: 97% (15 May 2022 07:58) (97% - 100%)    FOCUSED PHYSICAL EXAM:  Patient seen at the bedside lying in bed.   Aox3 awake  Pulm: Lung sound clear b/l  Cardiac: S1, S2  GI: + abd tenderness to mid abdomen, LLQ, LUQ, bowel sounds present in all quadrants, non-distended      LABS:                        7.7    10.30 )-----------( 218      ( 15 May 2022 06:05 )             24.3     05-15    144  |  114<H>  |  3<L>  ----------------------------<  86  3.7   |  24  |  0.54    Ca    7.8<L>      15 May 2022 06:05  Phos  3.1     05-15  Mg     2.1     05-15    TPro  5.4<L>  /  Alb  2.5<L>  /  TBili  0.3  /  DBili  <0.1  /  AST  30  /  ALT  41  /  AlkPhos  94  05-15        IMAGING: < from: CT Abdomen and Pelvis w/ Oral Cont (05.14.22 @ 01:57) >    FINDINGS:  LOWER CHEST: Within normal limits.    LIVER: Within normal limits.  BILE DUCTS: Normal caliber.  GALLBLADDER: Within normal limits.  SPLEEN: Within normal limits.  PANCREAS: Within normal limits.  ADRENALS: Within normal limits.  KIDNEYS/URETERS: Within normal limits.    BLADDER: Within normal limits.  REPRODUCTIVE ORGANS: Uterus appears enlarged. Limited evaluation without   intravenous contrast.    BOWEL: No bowel obstruction. Appendix is normal.  PERITONEUM: Trace fluid in the pelvis likely physiologic.  VESSELS: Within normal limits.  RETROPERITONEUM/LYMPH NODES: No lymphadenopathy.  ABDOMINAL WALL: Tiny fat-containing umbilical hernia. Probable injection   granuloma in the right posterior subcutaneous tissues of the buttocks.  BONES: Within normal limits.    IMPRESSION:    No acute finding.    < end of copied text >        HPI:   Patient is a 46 y/o F w/ Hx of Breast CA (s/p bilateral mastectomy, s/p L sentinel LN biopsy), last chemo was May 5, 2022, HTN, HLD, Hypothyroidism. She came to the ED due to onset of severe intermittent cramping non-radiating generalized abdominal pain. It is associated with nausea, non-billous, non-bloody vomiting, heartburn, loose watery diarrhea. Associated with eating, continued watery non-bloody diarrhea, cramping, Cdiff neg, Imaging neg, denies N/V, remains on clear liquid diet.     ASSESSMENT:   #Acute gastroenteritis  #abd pain  #likely chemo induced diarrhea  # rule out infectious diarrhea      PLAN:   Send Stool Culture  Send Ova and Parasites  Start Simethicone 80mg QID PRN  Start Bentyl 10 mg QID PRN  Continue Clear Liquid diet and advance as tolerated    FOLLOW UP / RESULT:   monitor response to above plan f/u stool testing
Pt was placed on contact isolation for C-diff. C-diff resulted as negative so the contact isolation was d/c.

## 2022-05-16 NOTE — PROGRESS NOTE ADULT - SUBJECTIVE AND OBJECTIVE BOX
Patient is a 47y old  Female who presents with a chief complaint of gastroenteritis (15 May 2022 06:36)    pt seen in icu [  ], reg med floor [   ], bed [  ], chair at bedside [   ], a+o x3 [  ], lethargic [  ],  nad [  ]    saenz [  ], ngt [  ], peg [  ], et tube [  ], cent line [  ], picc line [  ]        Allergies    No Known Allergies        Vitals    T(F): 98.2 (22 @ 05:25), Max: 98.8 (05-15-22 @ 21:39)  HR: 63 (22 @ 05:25) (63 - 85)  BP: 101/67 (22 @ 05:25) (101/67 - 114/76)  RR: 17 (22 @ 05:25) (17 - 18)  SpO2: 97% (22 @ 05:25) (97% - 100%)  Wt(kg): --  CAPILLARY BLOOD GLUCOSE          Labs                          8.3    9.44  )-----------( 223      ( 16 May 2022 07:30 )             26.1       -    143  |  111<H>  |  3<L>  ----------------------------<  93  3.9   |  26  |  x     Ca    8.9      16 May 2022 07:30  Phos  3.1     -15  Mg     2.1         TPro  x   /  Alb  2.8<L>  /  TBili  x   /  DBili  x   /  AST  x   /  ALT  x   /  AlkPhos  x                   Radiology Results      Meds    MEDICATIONS  (STANDING):  atorvastatin 10 milliGRAM(s) Oral at bedtime  levothyroxine 88 MICROGram(s) Oral daily  pantoprazole  Injectable 40 milliGRAM(s) IV Push daily  sodium chloride 0.9%. 1000 milliLiter(s) (75 mL/Hr) IV Continuous <Continuous>      MEDICATIONS  (PRN):  aluminum hydroxide/magnesium hydroxide/simethicone Suspension 30 milliLiter(s) Oral every 6 hours PRN Dyspepsia  dicyclomine 10 milliGRAM(s) Oral four times a day before meals PRN upset stomach  ondansetron Injectable 4 milliGRAM(s) IV Push every 8 hours PRN Nausea and/or Vomiting  simethicone 80 milliGRAM(s) Chew four times a day PRN Upset Stomach      Physical Exam    Neuro :  no focal deficits  Respiratory: CTA B/L  CV: RRR, S1S2, no murmurs,   Abdominal: Soft, NT, ND +BS,  Extremities: No edema, + peripheral pulses    ASSESSMENT    Abdominal pain    Hypothyroidism    History of loop recorder    Endometriosis    Anemia    Migraines    HLD (hyperlipidemia)    Breast cyst, right     delivery delivered    History of varicose veins    H/O bilateral mastectomy        PLAN     Patient is a 47y old  Female who presents with a chief complaint of gastroenteritis (15 May 2022 06:36)      pt seen in icu [  ], reg med floor [  x ], bed [ x ], chair at bedside [   ], a+o x3 [x  ], lethargic [  ],  nad [ x ]      Allergies    No Known Allergies        Vitals    T(F): 98.2 (05-16-22 @ 05:25), Max: 98.8 (05-15-22 @ 21:39)  HR: 63 (05-16-22 @ 05:25) (63 - 85)  BP: 101/67 (05-16-22 @ 05:25) (101/67 - 114/76)  RR: 17 (05-16-22 @ 05:25) (17 - 18)  SpO2: 97% (05-16-22 @ 05:25) (97% - 100%)  Wt(kg): --  CAPILLARY BLOOD GLUCOSE          Labs                          8.3    9.44  )-----------( 223      ( 16 May 2022 07:30 )             26.1       05-16    143  |  111<H>  |  3<L>  ----------------------------<  93  3.9   |  26  |  x     Ca    8.9      16 May 2022 07:30  Phos  3.1     05-15  Mg     2.1     05-16    TPro  x   /  Alb  2.8<L>  /  TBili  x   /  DBili  x   /  AST  x   /  ALT  x   /  AlkPhos  x   05-16        Clostridium difficile Toxin by PCR (05.14.22 @ 20:03)   C Diff by PCR Result: [facility]         Radiology Results      Meds    MEDICATIONS  (STANDING):  atorvastatin 10 milliGRAM(s) Oral at bedtime  levothyroxine 88 MICROGram(s) Oral daily  pantoprazole  Injectable 40 milliGRAM(s) IV Push daily  sodium chloride 0.9%. 1000 milliLiter(s) (75 mL/Hr) IV Continuous <Continuous>      MEDICATIONS  (PRN):  aluminum hydroxide/magnesium hydroxide/simethicone Suspension 30 milliLiter(s) Oral every 6 hours PRN Dyspepsia  dicyclomine 10 milliGRAM(s) Oral four times a day before meals PRN upset stomach  ondansetron Injectable 4 milliGRAM(s) IV Push every 8 hours PRN Nausea and/or Vomiting  simethicone 80 milliGRAM(s) Chew four times a day PRN Upset Stomach      Physical Exam    Neuro :  no focal deficits  Respiratory: CTA B/L  CV: RRR, S1S2, no murmurs,   Abdominal: Soft, mild llq tender to deep palp, ND +BS,  Extremities: No edema, + peripheral pulses      ASSESSMENT    acute gastroenteritis poss 2nd to chemo tx,   c diff r/o   h/o Breast CA (s/p bilateral mastectomy, s/p L sentinel LN biopsy),   last chemo was May 5, 2022,   HTN,   HLD,   Hypothyroidism  loop recorder  Endometriosis  Anemia  Migraines      PLAN    cont zofran prn  symptoms resolved,   gi cons  cont ivf   stool c diff neg noted above  wbc wnl   f/u stool occult blood  transfuse prbc for hgb <7   hypokalemia resilved   pt tolerating clears adv to full liquid then dash as tolerated   heme onc f/u  cont current meds.

## 2022-05-16 NOTE — PROGRESS NOTE ADULT - PROBLEM SELECTOR PLAN 1
p/w abd pain with N/V, diarrhea. resolved  likely chemo induced, less likely infectious source  leukocytosis likely reactive, resolved  CT A/P - no acute findings  Cxr unremarkable  c/w Protonix IV   C/w Zofran prn  c/w IVF  advance diet as tolerated

## 2022-05-16 NOTE — PROGRESS NOTE ADULT - SUBJECTIVE AND OBJECTIVE BOX
CHIEF COMPLAINT  Gastroenteritis    HISTORY OF PRESENT ILLNESS  NADYA REYES is a 47y Female who presents with a chief complaint of gastroenteritis.    No acute events. No complaints.    REVIEW OF SYSTEMS  A complete review of systems was performed; negative except per HPI    PHYSICAL EXAM  T(C): 36.8 (05-16-22 @ 05:25), Max: 37.1 (05-15-22 @ 21:39)  HR: 63 (05-16-22 @ 05:25) (63 - 85)  BP: 101/67 (05-16-22 @ 05:25) (101/67 - 114/76)  RR: 17 (05-16-22 @ 05:25) (17 - 18)  SpO2: 97% (05-16-22 @ 05:25) (97% - 100%)  Constitutional: alert, awake, in no acute distress  Eyes: PERRL, EOMI  HEENT: normocephalic, atraumatic  Neck: supple, non-tender  Cardiovascular: normal perfusion, no peripheral edema  Respiratory: normal respiratory efforts; no increased use of accessory muscles  Gastrointestinal: soft, non-tender  Musculoskeletal: normal range of motion, no deformities noted  Neurological: alert, CN II to XI grossly intact  Skin: warm, dry    LABORATORY DATA                        8.3    9.44  )-----------( 223      ( 16 May 2022 07:30 )             26.1     05-16    143  |  111<H>  |  3<L>  ----------------------------<  93  3.9   |  26  |  0.61    Ca    8.9      16 May 2022 07:30  Phos  3.9     05-16  Mg     2.1     05-16    TPro  5.6<L>  /  Alb  2.8<L>  /  TBili  0.3  /  DBili  <0.1  /  AST  28  /  ALT  39  /  AlkPhos  88  05-16   CHIEF COMPLAINT  Gastroenteritis    HISTORY OF PRESENT ILLNESS  NADYA REYES is a 47y Female who presents with a chief complaint of gastroenteritis.    No acute events. No complaints. Tolerating oral full liquid diet.    REVIEW OF SYSTEMS  A complete review of systems was performed; negative except per HPI    PHYSICAL EXAM  T(C): 36.8 (05-16-22 @ 05:25), Max: 37.1 (05-15-22 @ 21:39)  HR: 63 (05-16-22 @ 05:25) (63 - 85)  BP: 101/67 (05-16-22 @ 05:25) (101/67 - 114/76)  RR: 17 (05-16-22 @ 05:25) (17 - 18)  SpO2: 97% (05-16-22 @ 05:25) (97% - 100%)  Constitutional: alert, awake, in no acute distress  Eyes: PERRL, EOMI  HEENT: normocephalic, atraumatic  Neck: supple, non-tender  Cardiovascular: normal perfusion, no peripheral edema  Respiratory: normal respiratory efforts; no increased use of accessory muscles  Gastrointestinal: soft, non-tender  Musculoskeletal: normal range of motion, no deformities noted  Neurological: alert, CN II to XI grossly intact  Skin: warm, dry    LABORATORY DATA                        8.3    9.44  )-----------( 223      ( 16 May 2022 07:30 )             26.1     05-16    143  |  111<H>  |  3<L>  ----------------------------<  93  3.9   |  26  |  0.61    Ca    8.9      16 May 2022 07:30  Phos  3.9     05-16  Mg     2.1     05-16    TPro  5.6<L>  /  Alb  2.8<L>  /  TBili  0.3  /  DBili  <0.1  /  AST  28  /  ALT  39  /  AlkPhos  88  05-16

## 2022-05-16 NOTE — PROGRESS NOTE ADULT - PROBLEM SELECTOR PLAN 2
on chemo (last chemo was 5/5/22) every 20+ days   currently on adjuvant treatment with docetaxel + cyclophosphamide + trastuzumab + pertuzumab. Last dose given on May 5th.  - No treatment while inpatient or in rehabilitation.  - Heme/onc Dr Salgado

## 2022-05-17 ENCOUNTER — TRANSCRIPTION ENCOUNTER (OUTPATIENT)
Age: 47
End: 2022-05-17

## 2022-05-17 VITALS
RESPIRATION RATE: 18 BRPM | HEART RATE: 90 BPM | DIASTOLIC BLOOD PRESSURE: 71 MMHG | OXYGEN SATURATION: 96 % | SYSTOLIC BLOOD PRESSURE: 112 MMHG | TEMPERATURE: 99 F

## 2022-05-17 LAB
ANION GAP SERPL CALC-SCNC: 7 MMOL/L — SIGNIFICANT CHANGE UP (ref 5–17)
BUN SERPL-MCNC: 5 MG/DL — LOW (ref 7–18)
CALCIUM SERPL-MCNC: 8.8 MG/DL — SIGNIFICANT CHANGE UP (ref 8.4–10.5)
CHLORIDE SERPL-SCNC: 108 MMOL/L — SIGNIFICANT CHANGE UP (ref 96–108)
CO2 SERPL-SCNC: 27 MMOL/L — SIGNIFICANT CHANGE UP (ref 22–31)
CREAT SERPL-MCNC: 0.66 MG/DL — SIGNIFICANT CHANGE UP (ref 0.5–1.3)
CULTURE RESULTS: SIGNIFICANT CHANGE UP
CULTURE RESULTS: SIGNIFICANT CHANGE UP
EGFR: 110 ML/MIN/1.73M2 — SIGNIFICANT CHANGE UP
GLUCOSE SERPL-MCNC: 94 MG/DL — SIGNIFICANT CHANGE UP (ref 70–99)
HCT VFR BLD CALC: 27.1 % — LOW (ref 34.5–45)
HGB BLD-MCNC: 8.5 G/DL — LOW (ref 11.5–15.5)
MCHC RBC-ENTMCNC: 27.3 PG — SIGNIFICANT CHANGE UP (ref 27–34)
MCHC RBC-ENTMCNC: 31.4 GM/DL — LOW (ref 32–36)
MCV RBC AUTO: 87.1 FL — SIGNIFICANT CHANGE UP (ref 80–100)
NRBC # BLD: 0 /100 WBCS — SIGNIFICANT CHANGE UP (ref 0–0)
PLATELET # BLD AUTO: 232 K/UL — SIGNIFICANT CHANGE UP (ref 150–400)
POTASSIUM SERPL-MCNC: 3.6 MMOL/L — SIGNIFICANT CHANGE UP (ref 3.5–5.3)
POTASSIUM SERPL-SCNC: 3.6 MMOL/L — SIGNIFICANT CHANGE UP (ref 3.5–5.3)
RBC # BLD: 3.11 M/UL — LOW (ref 3.8–5.2)
RBC # FLD: 15.9 % — HIGH (ref 10.3–14.5)
SODIUM SERPL-SCNC: 142 MMOL/L — SIGNIFICANT CHANGE UP (ref 135–145)
SPECIMEN SOURCE: SIGNIFICANT CHANGE UP
SPECIMEN SOURCE: SIGNIFICANT CHANGE UP
WBC # BLD: 6.01 K/UL — SIGNIFICANT CHANGE UP (ref 3.8–10.5)
WBC # FLD AUTO: 6.01 K/UL — SIGNIFICANT CHANGE UP (ref 3.8–10.5)

## 2022-05-17 RX ORDER — PANTOPRAZOLE SODIUM 20 MG/1
40 TABLET, DELAYED RELEASE ORAL
Refills: 0 | Status: DISCONTINUED | OUTPATIENT
Start: 2022-05-17 | End: 2022-05-17

## 2022-05-17 RX ORDER — ONDANSETRON 8 MG/1
1 TABLET, FILM COATED ORAL
Qty: 12 | Refills: 0
Start: 2022-05-17 | End: 2022-05-19

## 2022-05-17 RX ORDER — SIMETHICONE 80 MG/1
1 TABLET, CHEWABLE ORAL
Qty: 0 | Refills: 0 | DISCHARGE
Start: 2022-05-17

## 2022-05-17 RX ADMIN — Medication 88 MICROGRAM(S): at 05:44

## 2022-05-17 RX ADMIN — PANTOPRAZOLE SODIUM 40 MILLIGRAM(S): 20 TABLET, DELAYED RELEASE ORAL at 15:46

## 2022-05-17 NOTE — DISCHARGE NOTE PROVIDER - HOSPITAL COURSE
Patient is a 46 y/o F w/ Hx of Breast CA (s/p bilateral mastectomy, s/p L sentinel LN biopsy), last chemo was May 5, 2022, HTN, HLD, Hypothyroidism. Admitted for abdominal pain and diarrhea  likely 2/2 acute gastroenteritis in the setting of chemo. less likely infectious source. Heme/onc following. CXR unremarkable. Ct abd, no acute findings. Started on IVF and advance diet as tolerated.  Pt was given PPi and zofran. Pt tolerated soft regular diet. Pt diarrhea resolved. Medically optimized for discharge.  Pt had recent chemo for breast ca. was seen by [facility]/omc Dr Guevara. NO intervention while inpatient   pt has clonically improved and medically optimized fro discharge.

## 2022-05-17 NOTE — DISCHARGE NOTE PROVIDER - PROVIDER TOKENS
FREE:[LAST:[Harper],FIRST:[Geno],PHONE:[(   )    -],FAX:[(   )    -],ADDRESS:[Pt's PCP],FOLLOWUP:[1 week]]

## 2022-05-17 NOTE — DISCHARGE NOTE NURSING/CASE MANAGEMENT/SOCIAL WORK - PATIENT PORTAL LINK FT
You can access the FollowMyHealth Patient Portal offered by [facility] by registering at the following website: [email or web address]. By joining [facility]’s FollowMyHealth portal, you will also be able to view your health information using other applications (apps) compatible with our system.

## 2022-05-17 NOTE — DISCHARGE NOTE PROVIDER - NSDCCPCAREPLAN_GEN_ALL_CORE_FT
PRINCIPAL DISCHARGE DIAGNOSIS  Diagnosis: Acute gastroenteritis  Assessment and Plan of Treatment: You presented to ED with [place], vomiting  and diarrhea likely due to chemo induced. You were given IVF and medication to help manage your symstoms. your sysmtoms has resoved with medication and IVF Pleas follow up with your PCP and follow up with your oncologist.  DISCHARGE INSTRUCTIONS:  Seek care immediately if:  You see blood in your vomit or your bowel movements.  You have sudden, severe pain in your chest and upper abdomen after hard vomiting or retching.  You have swelling in your neck and chest.  You are dizzy, cold, and thirsty and your eyes and mouth are dry.  You are urinating very little or not at all.  You have muscle weakness, leg cramps, and trouble breathing.  Your heart is beating much faster than normal.  Drink more liquids as directed. Vomiting can lead to dehydration. It is important to drink more liquids to help replace lost body fluids.   Eat smaller meals, more often. Eat small amounts of food every 2 to 3 hours, even if you are not hungry. Food in your stomach may decrease your nausea.        SECONDARY DISCHARGE DIAGNOSES  Diagnosis: Breast cancer  Assessment and Plan of Treatment: Please follow up with your oncologist and continue chemo as recommeded by your docotor.

## 2022-05-17 NOTE — DISCHARGE NOTE NURSING/CASE MANAGEMENT/SOCIAL WORK - NSDCPEFALRISK_GEN_ALL_CORE
For information on Fall & Injury Prevention, visit: [email or web address] OR  [email or web address] OR  https://www.cdc.gov/steadi/patient.html

## 2022-05-17 NOTE — PROGRESS NOTE ADULT - SUBJECTIVE AND OBJECTIVE BOX
Patient is a 47y old  Female who presents with a chief complaint of gastroenteritis (16 May 2022 12:38)    pt seen in icu [  ], reg med floor [   ], bed [  ], chair at bedside [   ], a+o x3 [  ], lethargic [  ],  nad [  ]    saenz [  ], ngt [  ], peg [  ], et tube [  ], cent line [  ], picc line [  ]        Allergies    No Known Allergies        Vitals    T(F): 98.1 (22 @ 05:26), Max: 98.9 (22 @ 21:06)  HR: 74 (22 @ 05:26) (74 - 86)  BP: 115/59 (22 @ 05:26) (104/70 - 115/59)  RR: 17 (22 @ 05:26) (16 - 17)  SpO2: 100% (22 @ 05:26) (97% - 100%)  Wt(kg): --  CAPILLARY BLOOD GLUCOSE          Labs                          8.5    6.01  )-----------( 232      ( 17 May 2022 06:36 )             27.1           142  |  108  |  5<L>  ----------------------------<  94  3.6   |  27  |  0.66    Ca    8.8      17 May 2022 06:36  Phos  3.9       Mg     2.1         TPro  5.6<L>  /  Alb  2.8<L>  /  TBili  0.3  /  DBili  <0.1  /  AST  28  /  ALT  39  /  AlkPhos  88  16            .Stool Feces  05-15 @ 23:24   Testing in progress  --  --      .Stool Feces  05-15 @ 23:23   No enteric pathogens to date: Final culture pending  --  --          Radiology Results      Meds    MEDICATIONS  (STANDING):  atorvastatin 10 milliGRAM(s) Oral at bedtime  levothyroxine 88 MICROGram(s) Oral daily  pantoprazole  Injectable 40 milliGRAM(s) IV Push daily  sodium chloride 0.9%. 1000 milliLiter(s) (75 mL/Hr) IV Continuous <Continuous>      MEDICATIONS  (PRN):  aluminum hydroxide/magnesium hydroxide/simethicone Suspension 30 milliLiter(s) Oral every 6 hours PRN Dyspepsia  dicyclomine 10 milliGRAM(s) Oral four times a day before meals PRN upset stomach  ondansetron Injectable 4 milliGRAM(s) IV Push every 8 hours PRN Nausea and/or Vomiting  simethicone 80 milliGRAM(s) Chew four times a day PRN Upset Stomach      Physical Exam    Neuro :  no focal deficits  Respiratory: CTA B/L  CV: RRR, S1S2, no murmurs,   Abdominal: Soft, NT, ND +BS,  Extremities: No edema, + peripheral pulses    ASSESSMENT    Abdominal pain    Hypothyroidism    History of loop recorder    Endometriosis    Anemia    Migraines    HLD (hyperlipidemia)    Breast cyst, right     delivery delivered    History of varicose veins    H/O bilateral mastectomy        PLAN     Patient is a 47y old  Female who presents with a chief complaint of gastroenteritis (16 May 2022 12:38)    pt seen in icu [  ], reg med floor [  x ], bed [ x ], chair at bedside [   ], a+o x3 [x  ], lethargic [  ],  nad [ x ]    pt states feeling better    Allergies    No Known Allergies        Vitals    T(F): 98.1 (05-17-22 @ 05:26), Max: 98.9 (05-16-22 @ 21:06)  HR: 74 (05-17-22 @ 05:26) (74 - 86)  BP: 115/59 (05-17-22 @ 05:26) (104/70 - 115/59)  RR: 17 (05-17-22 @ 05:26) (16 - 17)  SpO2: 100% (05-17-22 @ 05:26) (97% - 100%)  Wt(kg): --  CAPILLARY BLOOD GLUCOSE          Labs                          8.5    6.01  )-----------( 232      ( 17 May 2022 06:36 )             27.1       05-17    142  |  108  |  5<L>  ----------------------------<  94  3.6   |  27  |  0.66    Ca    8.8      17 May 2022 06:36  Phos  3.9     05-16  Mg     2.1     05-16    TPro  5.6<L>  /  Alb  2.8<L>  /  TBili  0.3  /  DBili  <0.1  /  AST  28  /  ALT  39  /  AlkPhos  88  05-16            .Stool Feces  05-15 @ 23:24   Testing in progress  --  --      .Stool Feces  05-15 @ 23:23   No enteric pathogens to date: Final culture pending  --  --          Radiology Results      Meds    MEDICATIONS  (STANDING):  atorvastatin 10 milliGRAM(s) Oral at bedtime  levothyroxine 88 MICROGram(s) Oral daily  pantoprazole  Injectable 40 milliGRAM(s) IV Push daily  sodium chloride 0.9%. 1000 milliLiter(s) (75 mL/Hr) IV Continuous <Continuous>      MEDICATIONS  (PRN):  aluminum hydroxide/magnesium hydroxide/simethicone Suspension 30 milliLiter(s) Oral every 6 hours PRN Dyspepsia  dicyclomine 10 milliGRAM(s) Oral four times a day before meals PRN upset stomach  ondansetron Injectable 4 milliGRAM(s) IV Push every 8 hours PRN Nausea and/or Vomiting  simethicone 80 milliGRAM(s) Chew four times a day PRN Upset Stomach      Physical Exam    Neuro :  no focal deficits  Respiratory: CTA B/L  CV: RRR, S1S2, no murmurs,   Abdominal: Soft, non-tender, ND +BS,  Extremities: No edema, + peripheral pulses      ASSESSMENT    acute gastroenteritis poss 2nd to chemo tx,   c diff r/o   h/o Breast CA (s/p bilateral mastectomy, s/p L sentinel LN biopsy),   last chemo was May 5, 2022,   HTN,   HLD,   Hypothyroidism  loop recorder  Endometriosis  Anemia  Migraines      PLAN    cont zofran prn  symptoms resolved,   stool c diff neg noted above   stool studies negative to date  wbc wnl   hgb stable   transfuse prbc for hgb <7   hypokalemia resolved   adv to soft diet  heme onc f/u   Patient follows with Dr. Frankie Montoya.  pt is currently on adjuvant treatment with docetaxel + cyclophosphamide + trastuzumab + pertuzumab. Last dose given on May 5th.  No treatment while inpatient or in rehabilitation.  cont current meds.   d/c if diet is tolerated

## 2022-05-17 NOTE — DISCHARGE NOTE PROVIDER - NSDCMRMEDTOKEN_GEN_ALL_CORE_FT
acetaminophen 500 mg oral tablet: 2 tab(s) orally every 6 hours   Advil 200 mg oral tablet: 2 tab(s) orally every 8 hours for pain. Do NOT take on an empty stomach.  atorvastatin 10 mg oral tablet: 1 tab(s) orally once a day (at bedtime)  levothyroxine 88 mcg (0.088 mg) oral tablet: 1 tab(s) orally once a day  simethicone 80 mg oral tablet, chewable: 1 tab(s) orally 4 times a day, As needed, Upset Stomach  traMADol 50 mg oral tablet: 1 tab(s) orally every 6 hours, As Needed -for severe pain MDD:4   Zofran 4 mg oral tablet: 1 tab(s) orally every 6 hours. PRN

## 2022-05-30 PROCEDURE — 87077 CULTURE AEROBIC IDENTIFY: CPT

## 2022-05-30 PROCEDURE — 87493 C DIFF AMPLIFIED PROBE: CPT

## 2022-05-30 PROCEDURE — 86900 BLOOD TYPING SEROLOGIC ABO: CPT

## 2022-05-30 PROCEDURE — 74176 CT ABD & PELVIS W/O CONTRAST: CPT | Mod: MA

## 2022-05-30 PROCEDURE — 85027 COMPLETE CBC AUTOMATED: CPT

## 2022-05-30 PROCEDURE — 84702 CHORIONIC GONADOTROPIN TEST: CPT

## 2022-05-30 PROCEDURE — 83735 ASSAY OF MAGNESIUM: CPT

## 2022-05-30 PROCEDURE — 36415 COLL VENOUS BLD VENIPUNCTURE: CPT

## 2022-05-30 PROCEDURE — 86901 BLOOD TYPING SEROLOGIC RH(D): CPT

## 2022-05-30 PROCEDURE — 96374 THER/PROPH/DIAG INJ IV PUSH: CPT

## 2022-05-30 PROCEDURE — 84100 ASSAY OF PHOSPHORUS: CPT

## 2022-05-30 PROCEDURE — 80053 COMPREHEN METABOLIC PANEL: CPT

## 2022-05-30 PROCEDURE — 83690 ASSAY OF LIPASE: CPT

## 2022-05-30 PROCEDURE — 85025 COMPLETE CBC W/AUTO DIFF WBC: CPT

## 2022-05-30 PROCEDURE — 99285 EMERGENCY DEPT VISIT HI MDM: CPT

## 2022-05-30 PROCEDURE — 96375 TX/PRO/DX INJ NEW DRUG ADDON: CPT

## 2022-05-30 PROCEDURE — 80048 BASIC METABOLIC PNL TOTAL CA: CPT

## 2022-05-30 PROCEDURE — 87635 SARS-COV-2 COVID-19 AMP PRB: CPT

## 2022-05-30 PROCEDURE — 86850 RBC ANTIBODY SCREEN: CPT

## 2022-05-30 PROCEDURE — 83605 ASSAY OF LACTIC ACID: CPT

## 2022-05-30 PROCEDURE — 87046 STOOL CULTR AEROBIC BACT EA: CPT

## 2022-05-30 PROCEDURE — 71045 X-RAY EXAM CHEST 1 VIEW: CPT

## 2022-05-30 PROCEDURE — 87177 OVA AND PARASITES SMEARS: CPT

## 2022-05-30 PROCEDURE — 87045 FECES CULTURE AEROBIC BACT: CPT

## 2022-05-30 PROCEDURE — 82248 BILIRUBIN DIRECT: CPT

## 2022-08-31 NOTE — PATIENT PROFILE ADULT - MONEY FOR FOOD
Patient is unable to attend his cardiac rehab orientation due to hospitalization. Staff discussed benefits of exercise, and program protocol with patient's wife. Teach back verified. Patient to reschedule for orientation after patient has recovered and physical therapy is complete.   no

## 2022-11-10 ENCOUNTER — APPOINTMENT (OUTPATIENT)
Dept: OBGYN | Facility: CLINIC | Age: 47
End: 2022-11-10

## 2023-03-07 NOTE — PATIENT PROFILE ADULT - CAREGIVER PHONE NUMBER
/ Opioid Counseling: I discussed with the patient the potential side effects of opioids including but not limited to addiction, altered mental status, and depression. I stressed avoiding alcohol, benzodiazepines, muscle relaxants and sleep aids unless specifically okayed by a physician. The patient verbalized understanding of the proper use and possible adverse effects of opioids. All of the patient's questions and concerns were addressed. They were instructed to flush the remaining pills down the toilet if they did not need them for pain.

## 2023-03-21 ENCOUNTER — APPOINTMENT (OUTPATIENT)
Dept: PLASTIC SURGERY | Facility: CLINIC | Age: 48
End: 2023-03-21
Payer: MEDICAID

## 2023-03-21 VITALS — OXYGEN SATURATION: 97 % | WEIGHT: 154 LBS | HEART RATE: 77 BPM | BODY MASS INDEX: 29.07 KG/M2 | HEIGHT: 61 IN

## 2023-03-21 PROCEDURE — 99205 OFFICE O/P NEW HI 60 MIN: CPT
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
## 2023-03-21 NOTE — ASSESSMENT
[FreeTextEntry1] : I reviewed with Ms. REYES in detail the risks, benefits, and alternatives of both revision of implant based breast reconstruction, as well as delayed autologous tissue reconstruction. Because of her histroy of radiation and postmastectomy pain and high grade Cap con I recommended that she have removal of the implants, capsulectomy and delayed Autologous recon with YOSHI flaps. She is an excellent candidate . She is also a candidate for nerve grafting / re sensation for postmastectomy pain syndrome\par \par I reviewed with TERESA in detail the risks, benefits, and alternatives of delayed breast reconstruction with autologous tissue, specifically using a free flap from her lower abdomen. This operation entails transplanting the skin, the fat, and blood vessels from the lower abdomen up to the chest wall, where we reattach the artery and vein to blood vessels on the chest wall to re-vascularize the tissue, called a "flap", utilizing microsurgical techniques. We attempt to save all muscle fibers of the abdominal rectus muscle to minimize the chance of an abdominal wall weakness, bulge, or hernia, and only transfer the perforating blood vessels. The is called a YOSHI flap. I explained to her the scar burden associated with this operation, including the scars on the breasts and lower abdomen, and around the umbilicus. I explained to her that there is a risk of free flap loss and vessel thrombosis requiring a return to the operating room for emergent exploration in an attempt to salvage the flap. If we do lose a flap due to vascular compromise, we would likely place a tissue expander in at the time of her return to the operating room in order to preserve the breast skin envelope and perform a delayed reconstruction. I reviewed the risks of abdominal wall morbidity, including but not limited to, abdominal wall weakness, hernia or bulge. \par \par The YOSHI flap is designed to minimize the risk of abdominal wall morbidity as opposed to a TRAM flap that cuts the abdominal wall muscle.  However, even a YOSHI flap has a small chance of abdominal wall bulge, weakness, or hernia. This operation is approximately 6 hours for one side and 9 hours for a bilateral procedure.  It entails a three day hospitalization and six week recovery period. \par \par I also explained that there is a possibility of contour abnormality, asymmetry between the two breasts, and possible need for revision surgery. A surgery on the native contralateral breast to achieve symmetry in the setting of a unilateral mastectomy is usually required and often performed at the time of the implant exchange or nipple reconstruction. The nipple areolar reconstruction is performed at a later date as a separate procedure.\par \par She will need to heal from hysterectomy scheduled for 3/23/23 prior to proceeding, about 3 months postop. \par RTC in July to begin planning for surgery.

## 2023-03-21 NOTE — HISTORY OF PRESENT ILLNESS
[FreeTextEntry1] : 47 y/o female with history of left breast cancer referred by Dr. Onofre presents for initial consultation for breast reconstruction revision and nipple reconstruction. Patient had mastectomy with silicone implants placed 03/2022 with Dr Onofre and Dr. Antoinette Arce at [facility]. Patient is not happy with the appearance of her breast and c/o pain Left > right had adjuvant radiation therapy . She takes gabapentin 300 mg nightly for the pain. No family history of breast or ovarian cancer. Patient had radiation and chemotherapy. Patient is currently doing immunology - herceptin and projeta, last session with be next month. Patient had genetic test done, results was negative. Patient is having hysterectomy on 03/23/23 in [facility] with Dr. Shani Roblero. Patient had 3 c-sections in 1993, 2005, and 2010. \par \par No personal or family history of bleeding/clotting. VTE score = 7

## 2023-06-09 NOTE — ASU PATIENT PROFILE, ADULT - HEALTH/HEALTHCARE ANXIETIES, PROFILE
Date: 06/09/2023          To whom it may concern:      Ms Renee Newton is my patient at [facility] 82  She has right trigger and ring finger for which she will be undergoing surgery in 1 month  She has tried to work however states that the pain is very severe on her right hand and goes up into her forearm  For this reason I am recommending for her to resume work after recovering from her hand surgery to be done on July 11th by Dr Giovana Costa  If you have any questions feel free to contact us at [phone]            Sincerely,            Georgi Mitchell MD /

## 2023-06-30 ENCOUNTER — RESULT REVIEW (OUTPATIENT)
Age: 48
End: 2023-06-30

## 2023-06-30 ENCOUNTER — APPOINTMENT (OUTPATIENT)
Dept: ORTHOPEDIC SURGERY | Facility: CLINIC | Age: 48
End: 2023-06-30
Payer: MEDICAID

## 2023-06-30 ENCOUNTER — TRANSCRIPTION ENCOUNTER (OUTPATIENT)
Age: 48
End: 2023-06-30

## 2023-06-30 VITALS — WEIGHT: 155 LBS | BODY MASS INDEX: 29.27 KG/M2 | RESPIRATION RATE: 16 BRPM | HEIGHT: 61 IN

## 2023-06-30 DIAGNOSIS — Z80.9 FAMILY HISTORY OF MALIGNANT NEOPLASM, UNSPECIFIED: ICD-10-CM

## 2023-06-30 DIAGNOSIS — Z85.3 PERSONAL HISTORY OF MALIGNANT NEOPLASM OF BREAST: ICD-10-CM

## 2023-06-30 DIAGNOSIS — R22.30 LOCALIZED SWELLING, MASS AND LUMP, UNSPECIFIED UPPER LIMB: ICD-10-CM

## 2023-06-30 DIAGNOSIS — Z82.61 FAMILY HISTORY OF ARTHRITIS: ICD-10-CM

## 2023-06-30 DIAGNOSIS — Z78.9 OTHER SPECIFIED HEALTH STATUS: ICD-10-CM

## 2023-06-30 DIAGNOSIS — Z86.39 PERSONAL HISTORY OF OTHER ENDOCRINE, NUTRITIONAL AND METABOLIC DISEASE: ICD-10-CM

## 2023-06-30 DIAGNOSIS — R20.0 ANESTHESIA OF SKIN: ICD-10-CM

## 2023-06-30 PROCEDURE — 73070 X-RAY EXAM OF ELBOW: CPT | Mod: LT,RT

## 2023-06-30 PROCEDURE — 99214 OFFICE O/P EST MOD 30 MIN: CPT

## 2023-06-30 PROCEDURE — 73140 X-RAY EXAM OF FINGER(S): CPT

## 2023-06-30 PROCEDURE — 99204 OFFICE O/P NEW MOD 45 MIN: CPT

## 2023-06-30 PROCEDURE — 73110 X-RAY EXAM OF WRIST: CPT | Mod: LT,RT

## 2023-06-30 RX ORDER — LEVOTHYROXINE SODIUM 137 UG/1
TABLET ORAL
Refills: 0 | Status: ACTIVE | COMMUNITY

## 2023-07-18 ENCOUNTER — APPOINTMENT (OUTPATIENT)
Dept: PLASTIC SURGERY | Facility: CLINIC | Age: 48
End: 2023-07-18
Payer: MEDICAID

## 2023-07-18 DIAGNOSIS — Z90.11 ACQUIRED ABSENCE OF RIGHT BREAST AND NIPPLE: ICD-10-CM

## 2023-07-18 PROCEDURE — 99214 OFFICE O/P EST MOD 30 MIN: CPT

## 2023-07-18 NOTE — ASSESSMENT
[FreeTextEntry1] : Reviewed plan for delayed YOSHI flap recon and implant removal and capsulectomy. Including the risk of flap failure. I explained in detail that the pain on the lateral chest wall L>R is combination of postmastectomy pain, scar tissue, and radiation damage and lipodystrophy and that liposuction is not a solution to the pain but that we can address the aesthetic deformity at the time of second stage recon and best treatment for pain is PT / OT but that hopefully she will feel better after capsulectomy, implant removal and YOSHI flap given her high grade CAP Con and radiation.\par \par Will schedule date for surgery and CTA. \par \par Patient states I answered all of her questions., \par \par
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
## 2023-07-18 NOTE — HISTORY OF PRESENT ILLNESS
[FreeTextEntry1] : Now 3 months PO from Lap hysterectomy - wants to proceed with BL YOSHI flap delayed recon. C.O pain lateral chest axilalry fold redumdancy. \par \par 49 y/o female with history of left breast cancer referred by Dr. Onofre presents to follow up for breast reconstruction revision and nipple reconstruction. Patient had mastectomy with silicone implants placed 03/2022 with Dr Onofre and Dr. Antoinette Arce at [facility]. Patient is not happy with the appearance of her breast and c/o pain Left > right had adjuvant radiation therapy . She takes gabapentin 300 mg nightly for the pain. No family history of breast or ovarian cancer. Patient had radiation and chemotherapy. Patient is currently doing immunology - herceptin and projeta, last session with be next month. Patient had genetic test done, results was negative. Patient is having hysterectomy on 03/23/23 in [facility] with Dr. Shani Roblero. Patient had 3 c-sections in 1993, 2005, and 2010.

## 2023-07-23 NOTE — ED PROVIDER NOTE - LATERALITY
Patient : Miguel Ángel Vigil Age: 32 year old Sex: male   MRN: [id number] Encounter Date: 7/23/2023      History     Chief Complaint   Patient presents with   • Foot Pain     Patient is a 32-year-old male with history of remote DVT and substance abuse (in remission x6 months) who presents to ED for evaluation of bilateral foot. The patient was recently undomiciled and has spent a significant amount of time outdoors, although he does endorse wearing dry socks and footwear.  He also recently found housing but continues to have bilateral foot pain.  He has been seen at several facilities for similar symptoms with negative work-up.  He notes mild redness of the plantar aspects of the feet bilaterally, but notes that the previously blistered/macerated areas are apparently improving.  No alleviating factors other than rest.  Aggravating factors include ambulation.  Denies fevers, chills, chest pain, cough, shortness of breath, abdominal pain, numbness, weakness, or additional complaints          No Known Allergies    Discharge Medication List as of 7/23/2023  2:08 PM      Prior to Admission Medications    Details   acetaminophen (TYLENOL) 500 MG tablet Take 2 tablets by mouth every 6 hours as needed for Fever.Eprescribe, Disp-30 tablet, R-0         New Prescriptions    Details   cephalexin (KEFLEX) 500 MG capsule Take 1 capsule by mouth 4 times daily for 7 days.Eprescribe, Disp-28 capsule, R-0         Discontinued Medications       ibuprofen (MOTRIN) 600 MG tablet              Past Medical History:   Diagnosis Date   • Known health problems: none        No past surgical history on file.    No family history on file.    Social History     Tobacco Use   • Smoking status: Every Day     Current packs/day: 0.50     Average packs/day: 0.5 packs/day for 15.0 years (7.5 ttl pk-yrs)     Types: Cigarettes   • Smokeless tobacco: Never   Substance Use Topics   • Alcohol use: Never   • Drug use: Not Currently     Types: Other      Comment: Takes Methadoen as )MAT)       Review of Systems   Constitutional: Negative for chills and fever.   HENT: Negative.    Respiratory: Negative for cough and shortness of breath.    Cardiovascular: Positive for leg swelling (chronic, mild, unchanged). Negative for chest pain.   Gastrointestinal: Negative for abdominal pain, constipation, diarrhea, nausea and vomiting.   Genitourinary: Negative.    Musculoskeletal: Positive for arthralgias (BL feet) and gait problem (pain w/ambulation). Negative for myalgias.   Skin: Positive for color change (mild erythema feet). Negative for wound.   Neurological: Negative for weakness, light-headedness and numbness.   Hematological: Negative.    Psychiatric/Behavioral: The patient is not nervous/anxious.    All other systems reviewed and are negative.      Physical Exam     ED Triage Vitals [07/23/23 0849]   ED Triage Vitals Group      Temp 98.2 °F (36.8 °C)      Heart Rate 83      Resp 16      BP (!) 144/79      SpO2 95 %      EtCO2 mmHg       Height       Weight       Weight Scale Used       BMI (Calculated)       IBW/kg (Calculated)        Physical Exam  Vitals and nursing note reviewed.   Constitutional:       General: He is not in acute distress.     Appearance: Normal appearance. He is not ill-appearing.   HENT:      Head: Normocephalic and atraumatic.      Nose: Nose normal.      Mouth/Throat:      Mouth: Mucous membranes are moist.      Pharynx: Oropharynx is clear.      Neck: Normal range of motion.   Eyes:      Extraocular Movements: Extraocular movements intact.   Cardiovascular:      Rate and Rhythm: Normal rate and regular rhythm.      Pulses: Normal pulses.           Dorsalis pedis pulses are 2+ on the right side and 2+ on the left side.        Posterior tibial pulses are 2+ on the right side and 2+ on the left side.      Heart sounds: Normal heart sounds.      Comments: DP and PT pulses 2+ and symmetric bilaterally  Pulmonary:      Effort: Pulmonary effort is  normal.      Breath sounds: Normal breath sounds. No wheezing, rhonchi or rales.   Abdominal:      General: Abdomen is flat.      Palpations: Abdomen is soft.      Tenderness: There is no abdominal tenderness. There is no guarding or rebound.   Musculoskeletal:         General: Normal range of motion.      Right lower leg: No edema.      Left lower leg: No edema.      Comments: Patient is ambulatory with steady gait. Moves all extremities with appropriate tone and coordination   Feet:      Comments: The plantar aspect of the feet are mildly erythematous, with some extension to the dorsum, left worse than right.  There is evidence of previous skin breakdown over the plantar feet, which now appears to be well-healed and hardened.  No new blisters or ulcerations.  Pulses intact  Skin:     General: Skin is warm and dry.   Neurological:      General: No focal deficit present.      Mental Status: He is alert and oriented to person, place, and time.      Sensory: No sensory deficit.   Psychiatric:         Mood and Affect: Mood normal.         Behavior: Behavior normal.         ED Course     Procedures    Lab Results     Results for orders placed or performed during the hospital encounter of 07/23/23   Comprehensive Metabolic Panel   Result Value Ref Range    Fasting Status      Sodium 142 135 - 145 mmol/L    Potassium 3.9 3.4 - 5.1 mmol/L    Chloride 105 97 - 110 mmol/L    Carbon Dioxide 31 21 - 32 mmol/L    Anion Gap 10 7 - 19 mmol/L    Glucose 99 70 - 99 mg/dL    BUN 18 6 - 20 mg/dL    Creatinine 0.74 0.67 - 1.17 mg/dL    Glomerular Filtration Rate >90 >=60    BUN/Cr 24 7 - 25    Calcium 8.2 (L) 8.4 - 10.2 mg/dL    Bilirubin, Total 0.4 0.2 - 1.0 mg/dL    GOT/ (H) <=37 Units/L    GPT/ (H) <64 Units/L    Alkaline Phosphatase 105 45 - 117 Units/L    Albumin 3.4 (L) 3.6 - 5.1 g/dL    Protein, Total 6.7 6.4 - 8.2 g/dL    Globulin 3.3 2.0 - 4.0 g/dL    A/G Ratio 1.0 1.0 - 2.4   C Reactive Protein   Result Value  Ref Range    C-Reactive Protein 0.6 <=1.0 mg/dL   Sedimentation Rate   Result Value Ref Range    RBC Sedimentation Rate 2 0 - 20 mm/hr   CBC with Automated Differential (performable only)   Result Value Ref Range    WBC 4.8 4.2 - 11.0 K/mcL    RBC 4.56 4.50 - 5.90 mil/mcL    HGB 13.2 13.0 - 17.0 g/dL    HCT 39.9 39.0 - 51.0 %    MCV 87.5 78.0 - 100.0 fl    MCH 28.9 26.0 - 34.0 pg    MCHC 33.1 32.0 - 36.5 g/dL    RDW-CV 15.1 (H) 11.0 - 15.0 %    RDW-SD 48.5 39.0 - 50.0 fL     140 - 450 K/mcL    NRBC 0 <=0 /100 WBC    Neutrophil, Percent 45 %    Lymphocytes, Percent 42 %    Mono, Percent 8 %    Eosinophils, Percent 4 %    Basophils, Percent 1 %    Immature Granulocytes 0 %    Absolute Neutrophils 2.1 1.8 - 7.7 K/mcL    Absolute Lymphocytes 2.0 1.0 - 4.8 K/mcL    Absolute Monocytes 0.4 0.3 - 0.9 K/mcL    Absolute Eosinophils  0.2 0.0 - 0.5 K/mcL    Absolute Basophils 0.0 0.0 - 0.3 K/mcL    Absolute Immature Granulocytes 0.0 0.0 - 0.2 K/mcL         Radiology Results     Imaging Results          US LIVER GALLBLADDER PANCREAS (RUQ) (Final result)  Result time 07/23/23 13:46:30    Final result                 Impression:      1.8 cm oval hypoechoic nodule in the perihepatic space may represent a  lymph node.  Further evaluation with contrast-enhanced CT recommended. Code  10 - Incidental Finding in ER case with appropriate non-emergent follow-up  as described.    Increased echogenicity in the right kidney may represent medical renal  disease.    Electronically Signed by: ELIS FANG M.D.   Signed on: 7/23/2023 1:46 PM   Workstation ID: [id number]             Narrative:    EXAM: US LIVER GALLBLADDER PANCREAS (RUQ)    CLINICAL INDICATION: Elevated liver function tests     COMPARISON: CT 12/22/2022     FINDINGS: The visualized portion of the pancreas appears unremarkable.  1.8  cm oval hypoechoic nodule in the perihepatic space.  The liver is normal in  size and echogenicity without evidence of focal intrahepatic  lesion or  biliary dilatation. The gallbladder is unremarkable. The common duct is  normal in caliber.  Increased echogenicity of the right kidney.                               US VASC LOWER EXTREMITY VENOUS DUPLEX BILATERAL (Final result)  Result time 07/23/23 10:57:56    Final result                 Impression:    No evidence for bilateral lower extremity DVT.     Dictated by: Jenn Ng MD  Dictated on: 7/23/2023 10:48 AM     I, ISABELA FERGUSON M.D., have reviewed the images and report and concur  with these findings interpreted by Jenn Ng MD.    Electronically Signed by: ISABELA FERGUSON M.D.   Signed on: 7/23/2023 10:57 AM   Workstation ID: [id number]             Narrative:    EXAM: BILATERAL LOWER EXTREMITY VENOUS ULTRASOUND    CLINICAL INDICATION: Bilateral foot pain, pain with ambulation    TECHNIQUE: Grayscale and color Doppler imaging of the bilateral lower  extremity venous system was performed.  Permanent images were stored in  PACS.    COMPARISON: Bilateral lower extremity venous duplex ultrasound 06/22/2023    FINDINGS: The right and left common femoral, femoral, and popliteal veins  demonstrate normal compressibility, color Doppler flow signal, phasic  variation, and positive augmentation without thrombus. Flow is demonstrated  at the origins of  bilateral greater saphenous and deep femoral veins. Some  flow is demonstrated in small portions of bilateral calf veins.     Few prominent but nonenlarged groin lymph nodes measuring up to 8 mm in  short axis bilaterally.                    Preliminary result                 Impression:    No evidence for bilateral lower extremity DVT.     Dictated by: Jenn Ng MD  Dictated on: 7/23/2023 10:48 AM       * * * * * * * * * * * * * * PRELIMINARY REPORT * * * * * * * * * * * * * *                  Narrative:        * * * * * * * * * * * * * * PRELIMINARY REPORT * * * * * * * * * * * * * *     EXAM: BILATERAL LOWER EXTREMITY VENOUS  ULTRASOUND    CLINICAL INDICATION: Bilateral foot pain, pain with ambulation    TECHNIQUE: Grayscale and color Doppler imaging of the bilateral lower  extremity venous system was performed.  Permanent images were stored in  PACS.    COMPARISON: Bilateral lower extremity venous duplex ultrasound 06/22/2023    FINDINGS: The right and left common femoral, femoral, and popliteal veins  demonstrate normal compressibility, color Doppler flow signal, phasic  variation, and positive augmentation without thrombus. Flow is demonstrated  at the origins of  bilateral greater saphenous and deep femoral veins. Some  flow is demonstrated in small portions of bilateral calf veins.     Few prominent but nonenlarged lymph nodes measuring up to 8 mm in short  axis bilaterally.                               XR FOOT 2 VIEWS BILATERAL (Final result)  Result time 07/23/23 09:49:23    Final result                 Impression:    FINDINGS AND IMPRESSION: No acute fracture or dislocation.  No significant  soft tissue swelling.    Electronically Signed by: ELIS FANG M.D.   Signed on: 7/23/2023 9:49 AM   Workstation ID: [id number]             Narrative:    EXAM: XR FOOT 2 VIEWS BILATERAL    CLINICAL INDICATION: Pain     COMPARISON: None.                                 ED Medication Orders (From admission, onward)    Ordered Start     Status Ordering Provider    07/23/23 1245 07/23/23 1246  cephalexin (KEFLEX) capsule 500 mg  ONCE         Last MAR action: Given RICK JOSEPH               Medical Decision Making  Patient is a 32-year-old male with history of previous homelessness (now in stable housing), substance use in remission, and remote DVT, who presents to ED for evaluation of bilateral plantar foot pain for the past several weeks.  On exam, patient is pleasant and in no acute distress.  Afebrile, with RRR and appropriate respirations and oxygenation on room air.    ED course:  No leukocytosis or anemia.  No evidence of CAESAR.   Electrolytes unremarkable.  Transaminitis noted - this has been observed on the past 4 ED visits by patient, but he has not yet followed up with his PCP regarding this.  As such, right upper quadrant ultrasound ordered.  X-rays of the bilateral feet obtained.  Duplex ultrasound of the bilateral lower extremities obtained.    MDM:  The patient presented with bilateral foot pain.  A duplex ultrasound was obtained, which was not suggestive of superficial thrombophlebitis nor DVT, but did note some mildly enlarged lymph nodes, which may be related to the patient's foot pain.  There was erythema consistent with mild cellulitis of the bilateral feet, but no large areas of desquamation or new blister formation.  No evidence of systemic infection including any fevers or chills and there is no leukocytosis.  The patient was found to again have elevated LFTs, and as he has not yet followed up with a PCP, decision was made to obtain a right upper quadrant ultrasound.  This was not suggestive of acute cholecystitis, the patient denies any abdominal pain.  It did note a slightly enlarged lymph node in this region and recommended nonemergent outpatient follow-up for this.  I did discuss these findings with the patient, and he expressed that he does intend to follow-up.  He was provided with dry socks and received first dose of cephalexin for mild cellulitis.    Plan:  The above findings and clinical rationale were discussed with the patient.  He will be discharged home on Keflex and is instructed to follow-up with his PCP, podiatry, and GI regarding his elevated LFTs.  He was also advised that he may return to the ED at anytime for any reason.  He expressed understanding of the above and was in agreement with plan for discharge.    Bilateral foot pain: acute illness or injury  Elevated LFTs: acute illness or injury  Amount and/or Complexity of Data Reviewed  External Data Reviewed: labs and notes.  Labs: ordered. Decision-making  details documented in ED Course.  Radiology: ordered. Decision-making details documented in ED Course.          Clinical Impression     ED Diagnosis   1. Bilateral foot pain        2. Elevated LFTs            Disposition        Discharge 7/23/2023  2:04 PM  Miguel Ángel Vigil discharge to home/self care.         Nay Mancia, PALISETTE  07/23/23 1821     right

## 2023-07-27 ENCOUNTER — OUTPATIENT (OUTPATIENT)
Dept: OUTPATIENT SERVICES | Facility: HOSPITAL | Age: 48
LOS: 1 days | End: 2023-07-27

## 2023-07-27 ENCOUNTER — APPOINTMENT (OUTPATIENT)
Dept: ULTRASOUND IMAGING | Facility: CLINIC | Age: 48
End: 2023-07-27
Payer: MEDICAID

## 2023-07-27 DIAGNOSIS — N60.01 SOLITARY CYST OF RIGHT BREAST: Chronic | ICD-10-CM

## 2023-07-27 DIAGNOSIS — Z90.13 ACQUIRED ABSENCE OF BILATERAL BREASTS AND NIPPLES: Chronic | ICD-10-CM

## 2023-07-27 DIAGNOSIS — Z86.79 PERSONAL HISTORY OF OTHER DISEASES OF THE CIRCULATORY SYSTEM: Chronic | ICD-10-CM

## 2023-07-27 PROCEDURE — 76882 US LMTD JT/FCL EVL NVASC XTR: CPT | Mod: 26,LT

## 2023-07-28 ENCOUNTER — APPOINTMENT (OUTPATIENT)
Dept: CT IMAGING | Facility: CLINIC | Age: 48
End: 2023-07-28
Payer: MEDICAID

## 2023-07-28 ENCOUNTER — OUTPATIENT (OUTPATIENT)
Dept: OUTPATIENT SERVICES | Facility: HOSPITAL | Age: 48
LOS: 1 days | End: 2023-07-28

## 2023-07-28 DIAGNOSIS — Z86.79 PERSONAL HISTORY OF OTHER DISEASES OF THE CIRCULATORY SYSTEM: Chronic | ICD-10-CM

## 2023-07-28 DIAGNOSIS — Z90.13 ACQUIRED ABSENCE OF BILATERAL BREASTS AND NIPPLES: Chronic | ICD-10-CM

## 2023-07-28 DIAGNOSIS — N60.01 SOLITARY CYST OF RIGHT BREAST: Chronic | ICD-10-CM

## 2023-07-28 PROCEDURE — 74174 CTA ABD&PLVS W/CONTRAST: CPT | Mod: 26

## 2023-08-03 ENCOUNTER — EMERGENCY (EMERGENCY)
Facility: HOSPITAL | Age: 48
LOS: 1 days | Discharge: HOME | End: 2023-08-03
Attending: EMERGENCY MEDICINE
Payer: MEDICAID

## 2023-08-03 VITALS
SYSTOLIC BLOOD PRESSURE: 100 MMHG | OXYGEN SATURATION: 96 % | DIASTOLIC BLOOD PRESSURE: 61 MMHG | RESPIRATION RATE: 18 BRPM | TEMPERATURE: 98 F | HEART RATE: 76 BPM

## 2023-08-03 VITALS
RESPIRATION RATE: 18 BRPM | SYSTOLIC BLOOD PRESSURE: 113 MMHG | DIASTOLIC BLOOD PRESSURE: 68 MMHG | HEART RATE: 81 BPM | WEIGHT: 167.55 LBS | OXYGEN SATURATION: 98 % | TEMPERATURE: 98 F | HEIGHT: 61 IN

## 2023-08-03 DIAGNOSIS — Z90.13 ACQUIRED ABSENCE OF BILATERAL BREASTS AND NIPPLES: Chronic | ICD-10-CM

## 2023-08-03 DIAGNOSIS — Z86.79 PERSONAL HISTORY OF OTHER DISEASES OF THE CIRCULATORY SYSTEM: Chronic | ICD-10-CM

## 2023-08-03 DIAGNOSIS — N60.01 SOLITARY CYST OF RIGHT BREAST: Chronic | ICD-10-CM

## 2023-08-03 LAB
ALBUMIN SERPL ELPH-MCNC: 3.5 G/DL — SIGNIFICANT CHANGE UP (ref 3.5–5)
ALP SERPL-CCNC: 76 U/L — SIGNIFICANT CHANGE UP (ref 40–120)
ALT FLD-CCNC: 40 U/L DA — SIGNIFICANT CHANGE UP (ref 10–60)
ANION GAP SERPL CALC-SCNC: 6 MMOL/L — SIGNIFICANT CHANGE UP (ref 5–17)
APPEARANCE UR: CLEAR — SIGNIFICANT CHANGE UP
APTT BLD: 30.4 SEC — SIGNIFICANT CHANGE UP (ref 24.5–35.6)
AST SERPL-CCNC: 20 U/L — SIGNIFICANT CHANGE UP (ref 10–40)
BASOPHILS # BLD AUTO: 0.02 K/UL — SIGNIFICANT CHANGE UP (ref 0–0.2)
BASOPHILS NFR BLD AUTO: 0.5 % — SIGNIFICANT CHANGE UP (ref 0–2)
BILIRUB SERPL-MCNC: 0.5 MG/DL — SIGNIFICANT CHANGE UP (ref 0.2–1.2)
BILIRUB UR-MCNC: NEGATIVE — SIGNIFICANT CHANGE UP
BUN SERPL-MCNC: 26 MG/DL — HIGH (ref 7–18)
CALCIUM SERPL-MCNC: 8.8 MG/DL — SIGNIFICANT CHANGE UP (ref 8.4–10.5)
CHLORIDE SERPL-SCNC: 106 MMOL/L — SIGNIFICANT CHANGE UP (ref 96–108)
CO2 SERPL-SCNC: 28 MMOL/L — SIGNIFICANT CHANGE UP (ref 22–31)
COLOR SPEC: YELLOW — SIGNIFICANT CHANGE UP
CREAT SERPL-MCNC: 0.72 MG/DL — SIGNIFICANT CHANGE UP (ref 0.5–1.3)
DIFF PNL FLD: NEGATIVE — SIGNIFICANT CHANGE UP
EGFR: 104 ML/MIN/1.73M2 — SIGNIFICANT CHANGE UP
EOSINOPHIL # BLD AUTO: 0.1 K/UL — SIGNIFICANT CHANGE UP (ref 0–0.5)
EOSINOPHIL NFR BLD AUTO: 2.7 % — SIGNIFICANT CHANGE UP (ref 0–6)
GLUCOSE SERPL-MCNC: 76 MG/DL — SIGNIFICANT CHANGE UP (ref 70–99)
GLUCOSE UR QL: NEGATIVE — SIGNIFICANT CHANGE UP
HCG SERPL-ACNC: <1 MIU/ML — SIGNIFICANT CHANGE UP
HCT VFR BLD CALC: 41.7 % — SIGNIFICANT CHANGE UP (ref 34.5–45)
HGB BLD-MCNC: 13.4 G/DL — SIGNIFICANT CHANGE UP (ref 11.5–15.5)
IMM GRANULOCYTES NFR BLD AUTO: 0.3 % — SIGNIFICANT CHANGE UP (ref 0–0.9)
INR BLD: 1.02 RATIO — SIGNIFICANT CHANGE UP (ref 0.85–1.18)
KETONES UR-MCNC: NEGATIVE — SIGNIFICANT CHANGE UP
LEUKOCYTE ESTERASE UR-ACNC: NEGATIVE — SIGNIFICANT CHANGE UP
LYMPHOCYTES # BLD AUTO: 1.01 K/UL — SIGNIFICANT CHANGE UP (ref 1–3.3)
LYMPHOCYTES # BLD AUTO: 27.2 % — SIGNIFICANT CHANGE UP (ref 13–44)
MCHC RBC-ENTMCNC: 29.5 PG — SIGNIFICANT CHANGE UP (ref 27–34)
MCHC RBC-ENTMCNC: 32.1 GM/DL — SIGNIFICANT CHANGE UP (ref 32–36)
MCV RBC AUTO: 91.9 FL — SIGNIFICANT CHANGE UP (ref 80–100)
MONOCYTES # BLD AUTO: 0.33 K/UL — SIGNIFICANT CHANGE UP (ref 0–0.9)
MONOCYTES NFR BLD AUTO: 8.9 % — SIGNIFICANT CHANGE UP (ref 2–14)
NEUTROPHILS # BLD AUTO: 2.24 K/UL — SIGNIFICANT CHANGE UP (ref 1.8–7.4)
NEUTROPHILS NFR BLD AUTO: 60.4 % — SIGNIFICANT CHANGE UP (ref 43–77)
NITRITE UR-MCNC: NEGATIVE — SIGNIFICANT CHANGE UP
NRBC # BLD: 0 /100 WBCS — SIGNIFICANT CHANGE UP (ref 0–0)
PH UR: 5 — SIGNIFICANT CHANGE UP (ref 5–8)
PLATELET # BLD AUTO: 220 K/UL — SIGNIFICANT CHANGE UP (ref 150–400)
POTASSIUM SERPL-MCNC: 4.1 MMOL/L — SIGNIFICANT CHANGE UP (ref 3.5–5.3)
POTASSIUM SERPL-SCNC: 4.1 MMOL/L — SIGNIFICANT CHANGE UP (ref 3.5–5.3)
PROT SERPL-MCNC: 7.2 G/DL — SIGNIFICANT CHANGE UP (ref 6–8.3)
PROT UR-MCNC: NEGATIVE — SIGNIFICANT CHANGE UP
PROTHROM AB SERPL-ACNC: 11.6 SEC — SIGNIFICANT CHANGE UP (ref 9.5–13)
RBC # BLD: 4.54 M/UL — SIGNIFICANT CHANGE UP (ref 3.8–5.2)
RBC # FLD: 13.3 % — SIGNIFICANT CHANGE UP (ref 10.3–14.5)
RBC CASTS # UR COMP ASSIST: SIGNIFICANT CHANGE UP /HPF (ref 0–2)
SODIUM SERPL-SCNC: 140 MMOL/L — SIGNIFICANT CHANGE UP (ref 135–145)
SP GR SPEC: 1.02 — SIGNIFICANT CHANGE UP (ref 1.01–1.02)
TROPONIN I, HIGH SENSITIVITY RESULT: 6.3 NG/L — SIGNIFICANT CHANGE UP
TROPONIN I, HIGH SENSITIVITY RESULT: 6.5 NG/L — SIGNIFICANT CHANGE UP
UROBILINOGEN FLD QL: NEGATIVE — SIGNIFICANT CHANGE UP
WBC # BLD: 3.71 K/UL — LOW (ref 3.8–10.5)
WBC # FLD AUTO: 3.71 K/UL — LOW (ref 3.8–10.5)
WBC UR QL: SIGNIFICANT CHANGE UP /HPF (ref 0–5)

## 2023-08-03 PROCEDURE — 82962 GLUCOSE BLOOD TEST: CPT

## 2023-08-03 PROCEDURE — 85730 THROMBOPLASTIN TIME PARTIAL: CPT

## 2023-08-03 PROCEDURE — 87186 SC STD MICRODIL/AGAR DIL: CPT

## 2023-08-03 PROCEDURE — 99285 EMERGENCY DEPT VISIT HI MDM: CPT | Mod: 25

## 2023-08-03 PROCEDURE — 71045 X-RAY EXAM CHEST 1 VIEW: CPT

## 2023-08-03 PROCEDURE — 71045 X-RAY EXAM CHEST 1 VIEW: CPT | Mod: 26

## 2023-08-03 PROCEDURE — 85025 COMPLETE CBC W/AUTO DIFF WBC: CPT

## 2023-08-03 PROCEDURE — 80053 COMPREHEN METABOLIC PANEL: CPT

## 2023-08-03 PROCEDURE — 87077 CULTURE AEROBIC IDENTIFY: CPT

## 2023-08-03 PROCEDURE — 85610 PROTHROMBIN TIME: CPT

## 2023-08-03 PROCEDURE — 99285 EMERGENCY DEPT VISIT HI MDM: CPT

## 2023-08-03 PROCEDURE — 81001 URINALYSIS AUTO W/SCOPE: CPT

## 2023-08-03 PROCEDURE — 93010 ELECTROCARDIOGRAM REPORT: CPT

## 2023-08-03 PROCEDURE — 84702 CHORIONIC GONADOTROPIN TEST: CPT

## 2023-08-03 PROCEDURE — 84484 ASSAY OF TROPONIN QUANT: CPT

## 2023-08-03 PROCEDURE — 87086 URINE CULTURE/COLONY COUNT: CPT

## 2023-08-03 PROCEDURE — 93005 ELECTROCARDIOGRAM TRACING: CPT

## 2023-08-03 PROCEDURE — 36415 COLL VENOUS BLD VENIPUNCTURE: CPT

## 2023-08-03 RX ORDER — ASPIRIN/CALCIUM CARB/MAGNESIUM 324 MG
162 TABLET ORAL ONCE
Refills: 0 | Status: COMPLETED | OUTPATIENT
Start: 2023-08-03 | End: 2023-08-03

## 2023-08-03 RX ORDER — SODIUM CHLORIDE 9 MG/ML
1000 INJECTION INTRAMUSCULAR; INTRAVENOUS; SUBCUTANEOUS ONCE
Refills: 0 | Status: COMPLETED | OUTPATIENT
Start: 2023-08-03 | End: 2023-08-03

## 2023-08-03 RX ORDER — ALPRAZOLAM 0.25 MG
0.5 TABLET ORAL ONCE
Refills: 0 | Status: DISCONTINUED | OUTPATIENT
Start: 2023-08-03 | End: 2023-08-03

## 2023-08-03 RX ADMIN — Medication 162 MILLIGRAM(S): at 18:08

## 2023-08-03 RX ADMIN — SODIUM CHLORIDE 1000 MILLILITER(S): 9 INJECTION INTRAMUSCULAR; INTRAVENOUS; SUBCUTANEOUS at 17:14

## 2023-08-03 RX ADMIN — Medication 0.5 MILLIGRAM(S): at 19:59

## 2023-08-03 NOTE — ED PROVIDER NOTE - NSFOLLOWUPINSTRUCTIONS_ED_ALL_ED_FT
English    Nonspecific Chest Pain, Adult  Chest pain is an uncomfortable, tight, or painful feeling in the chest. The pain can feel like a crushing, aching, or squeezing pressure. A person can feel a burning or tingling sensation. Chest pain can also be felt in your back, neck, jaw, shoulder, or arm. This pain can be worse when you move, sneeze, or take a deep breath.    Chest pain can be caused by a condition that is life-threatening. This must be treated right away. It can also be caused by something that is not life-threatening. If you have chest pain, it can be hard to know the difference, so it is important to get help right away to make sure that you do not have a serious condition.    Some life-threatening causes of chest pain include:  Heart attack.  A tear in the body's main blood vessel (aortic dissection).  Inflammation around your heart (pericarditis).  A problem in the lungs, such as a blood clot (pulmonary embolism) or a collapsed lung (pneumothorax).  Some non life-threatening causes of chest pain include:  Heartburn.  Anxiety or stress.  Damage to the bones, muscles, and cartilage that make up your chest wall.  Pneumonia or bronchitis.  Shingles infection (varicella-zoster virus).  Your chest pain may come and go. It may also be constant. Your health care provider will do tests and other studies to find the cause of your pain. Treatment will depend on the cause of your chest pain.    Follow these instructions at home:  Medicines    Take over-the-counter and prescription medicines only as told by your health care provider.  If you were prescribed an antibiotic medicine, take it as told by your health care provider. Do not stop taking the antibiotic even if you start to feel better.  Activity    Avoid any activities that cause chest pain.  Do not lift anything that is heavier than 10 lb (4.5 kg), or the limit that you are told, until your health care provider says that it is safe.  Rest as directed by your health care provider.  Return to your normal activities only as told by your health care provider. Ask your health care provider what activities are safe for you.  Lifestyle    A plate along with examples of foods in a healthy diet.  Silhouette of a person sitting on the floor doing yoga.  Do not use any products that contain nicotine or tobacco, such as cigarettes, e-cigarettes, and chewing tobacco. If you need help quitting, ask your health care provider.  Do not drink alcohol.  Make healthy lifestyle changes as recommended. These may include:  Getting regular exercise. Ask your health care provider to suggest some exercises that are safe for you.  Eating a heart-healthy diet. This includes plenty of fresh fruits and vegetables, whole grains, low-fat (lean) protein, and low-fat dairy products. A dietitian can help you find healthy eating options.  Maintaining a healthy weight.  Managing any other health conditions you may have, such as high blood pressure (hypertension) or diabetes.  Reducing stress, such as with yoga or relaxation techniques.  General instructions    Pay attention to any changes in your symptoms.  It is up to you to get the results of any tests that were done. Ask your health care provider, or the department that is doing the tests, when your results will be ready.  Keep all follow-up visits as told by your health care provider. This is important.  You may be asked to go for further testing if your chest pain does not go away.  Contact a health care provider if:  Your chest pain does not go away.  You feel depressed.  You have a fever.  You notice changes in your symptoms or develop new symptoms.  Get help right away if:  Your chest pain gets worse.  You have a cough that gets worse, or you cough up blood.  You have severe pain in your abdomen.  You faint.  You have sudden, unexplained chest discomfort.  You have sudden, unexplained discomfort in your arms, back, neck, or jaw.  You have shortness of breath at any time.  You suddenly start to sweat, or your skin gets clammy.  You feel nausea or you vomit.  You suddenly feel lightheaded or dizzy.  You have severe weakness, or unexplained weakness or fatigue.  Your heart begins to beat quickly, or it feels like it is skipping beats.  These symptoms may represent a serious problem that is an emergency. Do not wait to see if the symptoms will go away. Get medical help right away. Call your local emergency services (911 in the [place] Do not drive yourself to the hospital.    Summary  Chest pain can be caused by a condition that is serious and requires urgent treatment. It may also be caused by something that is not life-threatening.  Your health care provider may do lab tests and other studies to find the cause of your pain.  Follow your health care provider's instructions on taking medicines, making lifestyle changes, and getting emergency treatment if symptoms become worse.  Keep all follow-up visits as told by your health care provider. This includes visits for any further testing if your chest pain does not go away.  This information is not intended to replace advice given to you by your health care provider. Make sure you discuss any questions you have with your health care provider.    Document Revised: 03/03/2022 Document Reviewed: 03/03/2022  Elsevier Patient Education © 2023 Elsevier Inc. Nonspecific Chest Pain, Adult  Chest pain is an uncomfortable, tight, or painful feeling in the chest. The pain can feel like a crushing, aching, or squeezing pressure. A person can feel a burning or tingling sensation. Chest pain can also be felt in your back, neck, jaw, shoulder, or arm. This pain can be worse when you move, sneeze, or take a deep breath.    Chest pain can be caused by a condition that is life-threatening. This must be treated right away. It can also be caused by something that is not life-threatening. If you have chest pain, it can be hard to know the difference, so it is important to get help right away to make sure that you do not have a serious condition.    Some life-threatening causes of chest pain include:  Heart attack.  A tear in the body's main blood vessel (aortic dissection).  Inflammation around your heart (pericarditis).  A problem in the lungs, such as a blood clot (pulmonary embolism) or a collapsed lung (pneumothorax).  Some non life-threatening causes of chest pain include:  Heartburn.  Anxiety or stress.  Damage to the bones, muscles, and cartilage that make up your chest wall.  Pneumonia or bronchitis.  Shingles infection (varicella-zoster virus).  Your chest pain may come and go. It may also be constant. Your health care provider will do tests and other studies to find the cause of your pain. Treatment will depend on the cause of your chest pain.    Follow these instructions at home:  Medicines    Take over-the-counter and prescription medicines only as told by your health care provider.  If you were prescribed an antibiotic medicine, take it as told by your health care provider. Do not stop taking the antibiotic even if you start to feel better.  Activity    Avoid any activities that cause chest pain.  Do not lift anything that is heavier than 10 lb (4.5 kg), or the limit that you are told, until your health care provider says that it is safe.  Rest as directed by your health care provider.  Return to your normal activities only as told by your health care provider. Ask your health care provider what activities are safe for you.  Lifestyle    A plate along with examples of foods in a healthy diet.  Silhouette of a person sitting on the floor doing yoga.  Do not use any products that contain nicotine or tobacco, such as cigarettes, e-cigarettes, and chewing tobacco. If you need help quitting, ask your health care provider.  Do not drink alcohol.  Make healthy lifestyle changes as recommended. These may include:  Getting regular exercise. Ask your health care provider to suggest some exercises that are safe for you.  Eating a heart-healthy diet. This includes plenty of fresh fruits and vegetables, whole grains, low-fat (lean) protein, and low-fat dairy products. A dietitian can help you find healthy eating options.  Maintaining a healthy weight.  Managing any other health conditions you may have, such as high blood pressure (hypertension) or diabetes.  Reducing stress, such as with yoga or relaxation techniques.  General instructions    Pay attention to any changes in your symptoms.  It is up to you to get the results of any tests that were done. Ask your health care provider, or the department that is doing the tests, when your results will be ready.  Keep all follow-up visits as told by your health care provider. This is important.  You may be asked to go for further testing if your chest pain does not go away.  Contact a health care provider if:  Your chest pain does not go away.  You feel depressed.  You have a fever.  You notice changes in your symptoms or develop new symptoms.  Get help right away if:  Your chest pain gets worse.  You have a cough that gets worse, or you cough up blood.  You have severe pain in your abdomen.  You faint.  You have sudden, unexplained chest discomfort.  You have sudden, unexplained discomfort in your arms, back, neck, or jaw.  You have shortness of breath at any time.  You suddenly start to sweat, or your skin gets clammy.  You feel nausea or you vomit.  You suddenly feel lightheaded or dizzy.  You have severe weakness, or unexplained weakness or fatigue.  Your heart begins to beat quickly, or it feels like it is skipping beats.  These symptoms may represent a serious problem that is an emergency. Do not wait to see if the symptoms will go away. Get medical help right away. Call your local emergency services (911 in the [place] Do not drive yourself to the hospital.    Summary  Chest pain can be caused by a condition that is serious and requires urgent treatment. It may also be caused by something that is not life-threatening.  Your health care provider may do lab tests and other studies to find the cause of your pain.  Follow your health care provider's instructions on taking medicines, making lifestyle changes, and getting emergency treatment if symptoms become worse.  Keep all follow-up visits as told by your health care provider. This includes visits for any further testing if your chest pain does not go away.  This information is not intended to replace advice given to you by your health care provider. Make sure you discuss any questions you have with your health care provider.    Document Revised: 03/03/2022 Document Reviewed: 03/03/2022  Elsevier Patient Education © 2023 Elsevier Inc.

## 2023-08-03 NOTE — ED ADULT NURSE NOTE - NSFALLUNIVINTERV_ED_ALL_ED
Bed/Stretcher in lowest position, wheels locked, appropriate side rails in place/Call bell, personal items and telephone in reach/Instruct patient to call for assistance before getting out of bed/chair/stretcher/Non-slip footwear applied when patient is off stretcher/[place] to call system/Physically safe environment - no spills, clutter or unnecessary equipment/Purposeful proactive rounding/Room/bathroom lighting operational, light cord in reach

## 2023-08-03 NOTE — ED ADULT NURSE NOTE - ED STAT RN HANDOFF DETAILS
Report given to QUENTIN Mcclure. Patient A&OX4. Patient denies pain and resting comfortable in bed. No acute distress.

## 2023-08-03 NOTE — ED PROVIDER NOTE - OBJECTIVE STATEMENT
47 y/o woman, h/o hypothyroidism, anemia, HL, c/o about 5 days of intermittent chest pain, dizziness, generalized weakness, fatigue, palpitations, and has felt anxious.  Denies shortness of breath/fever/cough/vomiting/abd pain/syncope/dysuria.  No leg swelling/pain.  No h/o DVT/PE/heart disease.  She has previously had b/l mastectomy and completed treatment for breast cancer and is in remission.  She recently had CT A/P for preparation for reconstructive surgery.

## 2023-08-03 NOTE — ED ADULT NURSE NOTE - OBJECTIVE STATEMENT
Patient presents to the ED A&OX4 c/o chest pain, dizziness, lethargy and headache starting one week ago. Patient denies LOC. No acute distress noted.

## 2023-08-03 NOTE — ED PROVIDER NOTE - CLINICAL SUMMARY MEDICAL DECISION MAKING FREE TEXT BOX
49 y/o woman, h/o hypothyroidism, anemia, HL, c/o about 5 days of intermittent chest pain, dizziness, generalized weakness, fatigue, palpitations, and has felt anxious--labs, EKG, CXR, trial of anxiety medication while in ED.  PE not suspected clinically.

## 2023-08-03 NOTE — ED PROVIDER NOTE - PATIENT PORTAL LINK FT
You can access the FollowMyHealth Patient Portal offered by [facility] by registering at the following website: [email or web address]. By joining [employer]’s FollowMyHealth portal, you will also be able to view your health information using other applications (apps) compatible with our system.

## 2023-08-07 LAB
-  AMIKACIN: SIGNIFICANT CHANGE UP
-  AMOXICILLIN/CLAVULANIC ACID: SIGNIFICANT CHANGE UP
-  AMPICILLIN/SULBACTAM: SIGNIFICANT CHANGE UP
-  AMPICILLIN: SIGNIFICANT CHANGE UP
-  AMPICILLIN: SIGNIFICANT CHANGE UP
-  AZTREONAM: SIGNIFICANT CHANGE UP
-  CEFAZOLIN: SIGNIFICANT CHANGE UP
-  CEFEPIME: SIGNIFICANT CHANGE UP
-  CEFTRIAXONE: SIGNIFICANT CHANGE UP
-  CEFUROXIME: SIGNIFICANT CHANGE UP
-  CIPROFLOXACIN: SIGNIFICANT CHANGE UP
-  CIPROFLOXACIN: SIGNIFICANT CHANGE UP
-  ERTAPENEM: SIGNIFICANT CHANGE UP
-  GENTAMICIN: SIGNIFICANT CHANGE UP
-  IMIPENEM: SIGNIFICANT CHANGE UP
-  LEVOFLOXACIN: SIGNIFICANT CHANGE UP
-  LEVOFLOXACIN: SIGNIFICANT CHANGE UP
-  MEROPENEM: SIGNIFICANT CHANGE UP
-  NITROFURANTOIN: SIGNIFICANT CHANGE UP
-  NITROFURANTOIN: SIGNIFICANT CHANGE UP
-  PIPERACILLIN/TAZOBACTAM: SIGNIFICANT CHANGE UP
-  TETRACYCLINE: SIGNIFICANT CHANGE UP
-  TOBRAMYCIN: SIGNIFICANT CHANGE UP
-  TRIMETHOPRIM/SULFAMETHOXAZOLE: SIGNIFICANT CHANGE UP
-  VANCOMYCIN: SIGNIFICANT CHANGE UP
CULTURE RESULTS: SIGNIFICANT CHANGE UP
METHOD TYPE: SIGNIFICANT CHANGE UP
METHOD TYPE: SIGNIFICANT CHANGE UP
ORGANISM # SPEC MICROSCOPIC CNT: SIGNIFICANT CHANGE UP
SPECIMEN SOURCE: SIGNIFICANT CHANGE UP

## 2023-08-11 ENCOUNTER — APPOINTMENT (OUTPATIENT)
Dept: ORTHOPEDIC SURGERY | Facility: CLINIC | Age: 48
End: 2023-08-11

## 2023-08-12 NOTE — DISCHARGE NOTE NURSING/CASE MANAGEMENT/SOCIAL WORK - NSDCPEFALRISK_GEN_ALL_CORE
For information on Fall & Injury Prevention, visit: [email or web address] OR  [email or web address] OR  https://www.cdc.gov/steadi/patient.html Bed/Stretcher in lowest position, wheels locked, appropriate side rails in place/Call bell, personal items and telephone in reach/Instruct patient to call for assistance before getting out of bed/chair/stretcher/Non-slip footwear applied when patient is off stretcher/[place] to call system/Physically safe environment - no spills, clutter or unnecessary equipment/Purposeful proactive rounding/Room/bathroom lighting operational, light cord in reach

## 2023-09-06 ENCOUNTER — APPOINTMENT (OUTPATIENT)
Dept: PLASTIC SURGERY | Facility: CLINIC | Age: 48
End: 2023-09-06
Payer: MEDICAID

## 2023-09-06 PROCEDURE — 99214 OFFICE O/P EST MOD 30 MIN: CPT | Mod: 95

## 2023-09-06 NOTE — HISTORY OF PRESENT ILLNESS
[Home] : at home, [unfilled] , at the time of the visit. [Medical Office: ([facility])___] : at the medical office located in  [Verbal consent obtained from patient] : the patient, [unfilled] [FreeTextEntry1] : Patient Name: NADYA REYES  : 1975  Date: 2023  Attending: Dr. Oren Lerman  HPI: preop consultation for bilateral delayed YOSHI flap reconstruction on 23.  Allergies: NKDA Medication prescribed: ibuprofen 600 mg, aspirin 325 mg, valium 5 mg, oxycodone 5 mg   ROS: complete 14 point review of systems negative except pertinent items reviewed in the HPI. Other non-contributory items reviewed in our new patient questionnaire and we have submitted it to be scanned into the medical record.   Physical Exam:  completed at time of in office evaluation   Assessment/Plan: We have discussed pre and postop instructions, recovery limitations, restrictions and expectations, ERAS protocol, NPO status, transportation home, postop medications, benefits and risks of the procedure. Pre-op labs reviewed. CTA reviewed. The patient would like to proceed with surgery as scheduled.  BARBARA HARE NP

## 2023-09-13 ENCOUNTER — TRANSCRIPTION ENCOUNTER (OUTPATIENT)
Age: 48
End: 2023-09-13

## 2023-09-13 VITALS
SYSTOLIC BLOOD PRESSURE: 106 MMHG | WEIGHT: 179.02 LBS | HEART RATE: 79 BPM | RESPIRATION RATE: 16 BRPM | HEIGHT: 61 IN | DIASTOLIC BLOOD PRESSURE: 73 MMHG | TEMPERATURE: 97 F | OXYGEN SATURATION: 100 %

## 2023-09-14 ENCOUNTER — RESULT REVIEW (OUTPATIENT)
Age: 48
End: 2023-09-14

## 2023-09-14 ENCOUNTER — APPOINTMENT (OUTPATIENT)
Dept: PLASTIC SURGERY | Facility: HOSPITAL | Age: 48
End: 2023-09-14

## 2023-09-14 ENCOUNTER — TRANSCRIPTION ENCOUNTER (OUTPATIENT)
Age: 48
End: 2023-09-14

## 2023-09-14 ENCOUNTER — INPATIENT (INPATIENT)
Facility: HOSPITAL | Age: 48
LOS: 3 days | Discharge: HOME HEALTH | DRG: 581 | End: 2023-09-18
Attending: PLASTIC SURGERY | Admitting: PLASTIC SURGERY
Payer: MEDICAID

## 2023-09-14 DIAGNOSIS — Z90.13 ACQUIRED ABSENCE OF BILATERAL BREASTS AND NIPPLES: Chronic | ICD-10-CM

## 2023-09-14 DIAGNOSIS — N60.01 SOLITARY CYST OF RIGHT BREAST: Chronic | ICD-10-CM

## 2023-09-14 DIAGNOSIS — Z86.79 PERSONAL HISTORY OF OTHER DISEASES OF THE CIRCULATORY SYSTEM: Chronic | ICD-10-CM

## 2023-09-14 DIAGNOSIS — Z90.710 ACQUIRED ABSENCE OF BOTH CERVIX AND UTERUS: Chronic | ICD-10-CM

## 2023-09-14 PROCEDURE — 88300 SURGICAL PATH GROSS: CPT | Mod: 26,59

## 2023-09-14 PROCEDURE — 21600 PARTIAL REMOVAL OF RIB: CPT | Mod: LT,59

## 2023-09-14 PROCEDURE — 64912 NRV RPR W/NRV ALGRFT 1ST: CPT | Mod: RT

## 2023-09-14 PROCEDURE — 38530 BIOPSY/REMOVAL LYMPH NODES: CPT | Mod: 82,LT

## 2023-09-14 PROCEDURE — 88305 TISSUE EXAM BY PATHOLOGIST: CPT | Mod: 26

## 2023-09-14 PROCEDURE — 21600 PARTIAL REMOVAL OF RIB: CPT | Mod: 82,LT,59

## 2023-09-14 PROCEDURE — 88304 TISSUE EXAM BY PATHOLOGIST: CPT | Mod: 26

## 2023-09-14 PROCEDURE — 64912 NRV RPR W/NRV ALGRFT 1ST: CPT | Mod: 82,RT

## 2023-09-14 PROCEDURE — 19371 PERI-IMPLT CAPSLC BRST COMPL: CPT | Mod: LT

## 2023-09-14 PROCEDURE — 19371 PERI-IMPLT CAPSLC BRST COMPL: CPT | Mod: RT

## 2023-09-14 PROCEDURE — 15777 ACELLULAR DERM MATRIX IMPLT: CPT | Mod: RT

## 2023-09-14 PROCEDURE — S2068: CPT | Mod: 82,RT

## 2023-09-14 PROCEDURE — 15777 ACELLULAR DERM MATRIX IMPLT: CPT | Mod: LT

## 2023-09-14 PROCEDURE — S2068: CPT | Mod: 82,LT

## 2023-09-14 PROCEDURE — 38530 BIOPSY/REMOVAL LYMPH NODES: CPT | Mod: 82,RT

## 2023-09-14 DEVICE — MESH PHASIX 3X8IN: Type: IMPLANTABLE DEVICE | Site: BILATERAL | Status: FUNCTIONAL

## 2023-09-14 DEVICE — GRAFT NERVE CONNECTOR 2X10MM: Type: IMPLANTABLE DEVICE | Site: BILATERAL | Status: FUNCTIONAL

## 2023-09-14 DEVICE — VISTASEAL FIBRIN HUMAN 10ML: Type: IMPLANTABLE DEVICE | Site: BILATERAL | Status: FUNCTIONAL

## 2023-09-14 DEVICE — CLIP APPLIER ETHICON LIGACLIP 9 3/8" SMALL: Type: IMPLANTABLE DEVICE | Site: BILATERAL | Status: FUNCTIONAL

## 2023-09-14 DEVICE — CARTRIDGE MICROCLIP 30: Type: IMPLANTABLE DEVICE | Site: BILATERAL | Status: FUNCTIONAL

## 2023-09-14 DEVICE — DOPPLER PROBE DISPOSABLE: Type: IMPLANTABLE DEVICE | Site: BILATERAL | Status: FUNCTIONAL

## 2023-09-14 DEVICE — CLIP APPLIER ETHICON LIGACLIP 11.5" MEDIUM: Type: IMPLANTABLE DEVICE | Site: BILATERAL | Status: FUNCTIONAL

## 2023-09-14 DEVICE — COUPLER VESSEL ANASTOMOTIC 3MM: Type: IMPLANTABLE DEVICE | Site: BILATERAL | Status: FUNCTIONAL

## 2023-09-14 DEVICE — LIGATING CLIPS SYNOVIS SUPERFINE MICROCLIP 6: Type: IMPLANTABLE DEVICE | Site: BILATERAL | Status: FUNCTIONAL

## 2023-09-14 DEVICE — COUPLER VESSEL ANASTOMOTIC 2.5MM: Type: IMPLANTABLE DEVICE | Site: BILATERAL | Status: FUNCTIONAL

## 2023-09-14 RX ORDER — GABAPENTIN 400 MG/1
200 CAPSULE ORAL
Refills: 0 | Status: DISCONTINUED | OUTPATIENT
Start: 2023-09-14 | End: 2023-09-16

## 2023-09-14 RX ORDER — ACETAMINOPHEN 500 MG
1000 TABLET ORAL ONCE
Refills: 0 | Status: COMPLETED | OUTPATIENT
Start: 2023-09-14 | End: 2023-09-14

## 2023-09-14 RX ORDER — APREPITANT 80 MG/1
40 CAPSULE ORAL ONCE
Refills: 0 | Status: COMPLETED | OUTPATIENT
Start: 2023-09-14 | End: 2023-09-14

## 2023-09-14 RX ORDER — SODIUM CHLORIDE 9 MG/ML
1000 INJECTION, SOLUTION INTRAVENOUS
Refills: 0 | Status: DISCONTINUED | OUTPATIENT
Start: 2023-09-14 | End: 2023-09-15

## 2023-09-14 RX ORDER — ENOXAPARIN SODIUM 100 MG/ML
40 INJECTION SUBCUTANEOUS ONCE
Refills: 0 | Status: COMPLETED | OUTPATIENT
Start: 2023-09-14 | End: 2023-09-14

## 2023-09-14 RX ORDER — HYDROMORPHONE HYDROCHLORIDE 2 MG/ML
0.5 INJECTION INTRAMUSCULAR; INTRAVENOUS; SUBCUTANEOUS
Refills: 0 | Status: DISCONTINUED | OUTPATIENT
Start: 2023-09-14 | End: 2023-09-18

## 2023-09-14 RX ORDER — METOCLOPRAMIDE HCL 10 MG
10 TABLET ORAL ONCE
Refills: 0 | Status: DISCONTINUED | OUTPATIENT
Start: 2023-09-14 | End: 2023-09-18

## 2023-09-14 RX ORDER — OXYCODONE HYDROCHLORIDE 5 MG/1
5 TABLET ORAL EVERY 4 HOURS
Refills: 0 | Status: DISCONTINUED | OUTPATIENT
Start: 2023-09-14 | End: 2023-09-16

## 2023-09-14 RX ORDER — CELECOXIB 200 MG/1
400 CAPSULE ORAL ONCE
Refills: 0 | Status: COMPLETED | OUTPATIENT
Start: 2023-09-14 | End: 2023-09-14

## 2023-09-14 RX ORDER — HYDROMORPHONE HYDROCHLORIDE 2 MG/ML
0.25 INJECTION INTRAMUSCULAR; INTRAVENOUS; SUBCUTANEOUS
Refills: 0 | Status: DISCONTINUED | OUTPATIENT
Start: 2023-09-14 | End: 2023-09-14

## 2023-09-14 RX ORDER — LEVOTHYROXINE SODIUM 125 MCG
88 TABLET ORAL DAILY
Refills: 0 | Status: DISCONTINUED | OUTPATIENT
Start: 2023-09-15 | End: 2023-09-18

## 2023-09-14 RX ORDER — SENNA PLUS 8.6 MG/1
1 TABLET ORAL EVERY 12 HOURS
Refills: 0 | Status: DISCONTINUED | OUTPATIENT
Start: 2023-09-14 | End: 2023-09-18

## 2023-09-14 RX ORDER — CEFAZOLIN SODIUM 1 G
2000 VIAL (EA) INJECTION EVERY 8 HOURS
Refills: 0 | Status: COMPLETED | OUTPATIENT
Start: 2023-09-14 | End: 2023-09-15

## 2023-09-14 RX ORDER — ENOXAPARIN SODIUM 100 MG/ML
40 INJECTION SUBCUTANEOUS EVERY 24 HOURS
Refills: 0 | Status: DISCONTINUED | OUTPATIENT
Start: 2023-09-15 | End: 2023-09-18

## 2023-09-14 RX ORDER — DIAZEPAM 5 MG
5 TABLET ORAL EVERY 8 HOURS
Refills: 0 | Status: DISCONTINUED | OUTPATIENT
Start: 2023-09-14 | End: 2023-09-16

## 2023-09-14 RX ORDER — OXYCODONE HYDROCHLORIDE 5 MG/1
10 TABLET ORAL EVERY 4 HOURS
Refills: 0 | Status: DISCONTINUED | OUTPATIENT
Start: 2023-09-14 | End: 2023-09-16

## 2023-09-14 RX ORDER — KETOROLAC TROMETHAMINE 30 MG/ML
30 SYRINGE (ML) INJECTION EVERY 6 HOURS
Refills: 0 | Status: DISCONTINUED | OUTPATIENT
Start: 2023-09-14 | End: 2023-09-17

## 2023-09-14 RX ORDER — ACETAMINOPHEN 500 MG
975 TABLET ORAL EVERY 8 HOURS
Refills: 0 | Status: DISCONTINUED | OUTPATIENT
Start: 2023-09-14 | End: 2023-09-18

## 2023-09-14 RX ORDER — BUPIVACAINE 13.3 MG/ML
20 INJECTION, SUSPENSION, LIPOSOMAL INFILTRATION ONCE
Refills: 0 | Status: DISCONTINUED | OUTPATIENT
Start: 2023-09-14 | End: 2023-09-14

## 2023-09-14 RX ORDER — GABAPENTIN 400 MG/1
300 CAPSULE ORAL ONCE
Refills: 0 | Status: COMPLETED | OUTPATIENT
Start: 2023-09-14 | End: 2023-09-14

## 2023-09-14 RX ORDER — SODIUM CHLORIDE 9 MG/ML
1000 INJECTION, SOLUTION INTRAVENOUS
Refills: 0 | Status: DISCONTINUED | OUTPATIENT
Start: 2023-09-14 | End: 2023-09-14

## 2023-09-14 RX ORDER — ONDANSETRON 8 MG/1
4 TABLET, FILM COATED ORAL EVERY 6 HOURS
Refills: 0 | Status: DISCONTINUED | OUTPATIENT
Start: 2023-09-14 | End: 2023-09-18

## 2023-09-14 RX ADMIN — Medication 30 MILLIGRAM(S): at 18:55

## 2023-09-14 RX ADMIN — CELECOXIB 400 MILLIGRAM(S): 200 CAPSULE ORAL at 07:13

## 2023-09-14 RX ADMIN — ONDANSETRON 4 MILLIGRAM(S): 8 TABLET, FILM COATED ORAL at 22:39

## 2023-09-14 RX ADMIN — Medication 30 MILLIGRAM(S): at 23:24

## 2023-09-14 RX ADMIN — APREPITANT 40 MILLIGRAM(S): 80 CAPSULE ORAL at 07:14

## 2023-09-14 RX ADMIN — ENOXAPARIN SODIUM 40 MILLIGRAM(S): 100 INJECTION SUBCUTANEOUS at 07:14

## 2023-09-14 RX ADMIN — SODIUM CHLORIDE 100 MILLILITER(S): 9 INJECTION, SOLUTION INTRAVENOUS at 19:12

## 2023-09-14 RX ADMIN — GABAPENTIN 300 MILLIGRAM(S): 400 CAPSULE ORAL at 07:13

## 2023-09-14 RX ADMIN — Medication 100 MILLIGRAM(S): at 21:49

## 2023-09-14 RX ADMIN — SENNA PLUS 1 TABLET(S): 8.6 TABLET ORAL at 18:55

## 2023-09-14 RX ADMIN — Medication 975 MILLIGRAM(S): at 21:49

## 2023-09-14 RX ADMIN — Medication 1000 MILLIGRAM(S): at 07:13

## 2023-09-14 RX ADMIN — GABAPENTIN 200 MILLIGRAM(S): 400 CAPSULE ORAL at 18:55

## 2023-09-14 NOTE — DISCHARGE NOTE PROVIDER - CARE PROVIDER_API CALL
Lerman, Oren Zvi  Plastic Surgery  [address], [address]  [place] [address]  Phone: [phone]  Fax: [phone]  Follow Up Time:

## 2023-09-14 NOTE — PRE-ANESTHESIA EVALUATION ADULT - NSANTHAPLANRD_GEN_ALL_CORE
Panel Management Review      Patient has the following on his problem list:     Depression / Dysthymia review    Measure:  Needs PHQ-9 score of 4 or less during index window.  Administer PHQ-9 and if score is 5 or more, send encounter to provider for next steps.    5 - 7 month window range: Due    PHQ-9 SCORE 11/7/2014 11/24/2015 2/28/2017   Total Score 0 - -   Total Score - 4 3       If PHQ-9 recheck is 5 or more, route to provider for next steps.    Patient is due for:  PHQ9    Hypertension   Last three blood pressure readings:  BP Readings from Last 3 Encounters:   06/20/17 117/80   06/12/17 114/68   05/26/17 124/74     Blood pressure: Passed    HTN Guidelines:  Age 18-59 BP range:  Less than 140/90  Age 60-85 with Diabetes:  Less than 140/90  Age 60-85 without Diabetes:  less than 150/90      Composite cancer screening  Chart review shows that this patient is due/due soon for the following Fecal Colorectal (FIT)  Summary:    Patient is due/failing the following:   ACT and FIT    Action needed:   Patient needs to do ACT. and Patient needs to do PHQ9. Fit    Type of outreach:    Phone, spoke to patient.  agreed to fit     Questions for provider review:    None                                                                                                                                    Adriane Edwards MA       Chart routed to Care Team .           general

## 2023-09-14 NOTE — BRIEF OPERATIVE NOTE - OPERATION/FINDINGS
B/l breast incisions reopened with removal or prior TE and capsule with new b/l deep inferior epigastric  flaps; cook doppler x2 with audible signal; BERNICE x4, b/l breasts and x2 in abdomen

## 2023-09-14 NOTE — DISCHARGE NOTE PROVIDER - NSDCFUADDINST_GEN_ALL_CORE_FT
Please follow up with Dr. Lerman within 1 weeks after discharge from the hospital. You may call to schedule an appointment.     *Please refer to the post-operative care instructions provided from Dr. Lerman’s office.    -Follow up with Plastic & Reconstructive Surgeon, Dr. Lerman in 1 week in the office.     -Continue BERNICE drain care as instructed. (Empty and record the BERNICE drainage twice daily after discharge. Also, strip/milk the drain tubing each time to minimize clogging. Bring the recorded drain amounts to the office so that it can be reviewed by the physician.)     -Take Aspirin 325mg once daily for 10days.   -Take Percocet & Valium as prescribed for pain control.    -Apply Bacitracin ointment to the belly button twice daily after discharge.    -Wear abdominal binder when out of bed, walking around.     -Diet: no restrictions.     -Showers are permitted the day of discharge. The drains and the incision lines can get wet in the shower. Do not take a bath. Pin the drains to a bathrobe belt or string or a small towel draped over your neck in order that the drains do not dangle from your skin while in the shower. Keep incision sites and BERNICE drain sites clean & dry after showering.     -No heavy lifting >20 pounds or strenuous exercises.     -Call Doctor’s office or return to ER if: fever (temperature >101.4F), chills, chest pain, shortness of breath, uncontrolled/severe pain, persistent nausea/vomiting, or bleeding/oozing/redness/swelling at incision sites.  	  -For routine questions, call the office ([phone]) weekdays 9:00 A.M. - 5:00 P.M.  For emergencies after business hours, call any time using this same office phone number and the answering service will put you in touch with Dr. Lerman.

## 2023-09-14 NOTE — DISCHARGE NOTE PROVIDER - NSDCFUSCHEDAPPT_GEN_ALL_CORE_FT
Lerman, Oren Z  [facility] Physician [facility]  [facility] [address]  Scheduled Appointment: 09/20/2023

## 2023-09-14 NOTE — DISCHARGE NOTE PROVIDER - NSDCCPCAREPLAN_GEN_ALL_CORE_FT
PRINCIPAL DISCHARGE DIAGNOSIS  Diagnosis: Status post bilateral breast reconstruction  Assessment and Plan of Treatment:

## 2023-09-14 NOTE — DISCHARGE NOTE PROVIDER - NSDCCPTREATMENT_GEN_ALL_CORE_FT
PRINCIPAL PROCEDURE  Procedure: YOSHI flap, free  Findings and Treatment:       SECONDARY PROCEDURE  Procedure: Breast implant removal  Findings and Treatment:

## 2023-09-14 NOTE — DISCHARGE NOTE PROVIDER - NSDCMRMEDTOKEN_GEN_ALL_CORE_FT
acetaminophen 500 mg oral tablet: 2 tab(s) orally every 6 hours   levothyroxine 88 mcg (0.088 mg) oral tablet: 1 tab(s) orally once a day   acetaminophen 500 mg oral tablet: 2 tab(s) orally every 6 hours   gabapentin 100 mg oral capsule: 2 cap(s) orally 3 times a day  levothyroxine 88 mcg (0.088 mg) oral tablet: 1 tab(s) orally once a day  senna leaf extract oral tablet: 1 tab(s) orally every 12 hours  Ultram 50 mg oral tablet: 1 tab(s) orally every 6 hours as needed for  severe pain MDD: 4  Ultram 50 mg oral tablet: 1 tab(s) orally every 6 hours as needed for  severe pain MDD: 4

## 2023-09-14 NOTE — PRE-ANESTHESIA EVALUATION ADULT - NSANTHOSAYNRD_GEN_A_CORE
No. SILVA screening performed.  STOP BANG Legend: 0-2 = LOW Risk; 3-4 = INTERMEDIATE Risk; 5-8 = HIGH Risk

## 2023-09-14 NOTE — DISCHARGE NOTE PROVIDER - HOSPITAL COURSE
48 year old female underwent bilateral delayed autologous breast reconstruction with bilateral YOSHI flaps. The patient had bilateral breast implant removals, capsulectomies and YOSHI free flaps with Dr. Lerman in the OR. She tolerated the procedure well. Postoperatively the patient was sent to the PACU to recover and then transferred to [address] [place] [address] for ERAS protocol and flap monitoring. The patient's flaps were monitored by cook doppler and by clinical examination. On POD 1, the patient was hemodynamically stable; was transferred to a surgical floor; was advanced to a regular diet; was placed on her home medications; and was out of bed to a chair; the saenz was removed and the patient voided appropriately. On POD 2, the patient continued to do well and ambulated. During the patient's hospital course, the patient's pain was controlled by IV pain medications and then by PO pain medications.    At the time of discharge, the patient was hemodynamically stable, was tolerating PO diet, ambulating, and was comfortable with adequate pain control. Cook doppler were removed and she was given a fresh bra. She was given follow up and post operative instructions for Dr. Lerman/ The patient/family felt comfortable with discharge. The patient received prescriptions. The patient had no other issues.

## 2023-09-15 LAB
ANION GAP SERPL CALC-SCNC: 8 MMOL/L — SIGNIFICANT CHANGE UP (ref 5–17)
BUN SERPL-MCNC: 12 MG/DL — SIGNIFICANT CHANGE UP (ref 7–23)
CALCIUM SERPL-MCNC: 8.6 MG/DL — SIGNIFICANT CHANGE UP (ref 8.4–10.5)
CHLORIDE SERPL-SCNC: 105 MMOL/L — SIGNIFICANT CHANGE UP (ref 96–108)
CO2 SERPL-SCNC: 24 MMOL/L — SIGNIFICANT CHANGE UP (ref 22–31)
CREAT SERPL-MCNC: 0.56 MG/DL — SIGNIFICANT CHANGE UP (ref 0.5–1.3)
EGFR: 114 ML/MIN/1.73M2 — SIGNIFICANT CHANGE UP
GLUCOSE SERPL-MCNC: 118 MG/DL — HIGH (ref 70–99)
HCT VFR BLD CALC: 33.7 % — LOW (ref 34.5–45)
HGB BLD-MCNC: 11 G/DL — LOW (ref 11.5–15.5)
MCHC RBC-ENTMCNC: 30 PG — SIGNIFICANT CHANGE UP (ref 27–34)
MCHC RBC-ENTMCNC: 32.6 GM/DL — SIGNIFICANT CHANGE UP (ref 32–36)
MCV RBC AUTO: 91.8 FL — SIGNIFICANT CHANGE UP (ref 80–100)
NRBC # BLD: 0 /100 WBCS — SIGNIFICANT CHANGE UP (ref 0–0)
PLATELET # BLD AUTO: 210 K/UL — SIGNIFICANT CHANGE UP (ref 150–400)
POTASSIUM SERPL-MCNC: 4 MMOL/L — SIGNIFICANT CHANGE UP (ref 3.5–5.3)
POTASSIUM SERPL-SCNC: 4 MMOL/L — SIGNIFICANT CHANGE UP (ref 3.5–5.3)
RBC # BLD: 3.67 M/UL — LOW (ref 3.8–5.2)
RBC # FLD: 13.9 % — SIGNIFICANT CHANGE UP (ref 10.3–14.5)
SODIUM SERPL-SCNC: 137 MMOL/L — SIGNIFICANT CHANGE UP (ref 135–145)
WBC # BLD: 8.91 K/UL — SIGNIFICANT CHANGE UP (ref 3.8–10.5)
WBC # FLD AUTO: 8.91 K/UL — SIGNIFICANT CHANGE UP (ref 3.8–10.5)

## 2023-09-15 RX ORDER — SODIUM CHLORIDE 9 MG/ML
500 INJECTION, SOLUTION INTRAVENOUS ONCE
Refills: 0 | Status: COMPLETED | OUTPATIENT
Start: 2023-09-15 | End: 2023-09-15

## 2023-09-15 RX ORDER — SODIUM CHLORIDE 9 MG/ML
1000 INJECTION, SOLUTION INTRAVENOUS ONCE
Refills: 0 | Status: COMPLETED | OUTPATIENT
Start: 2023-09-15 | End: 2023-09-15

## 2023-09-15 RX ORDER — SODIUM CHLORIDE 9 MG/ML
1000 INJECTION, SOLUTION INTRAVENOUS
Refills: 0 | Status: DISCONTINUED | OUTPATIENT
Start: 2023-09-15 | End: 2023-09-16

## 2023-09-15 RX ORDER — CEFAZOLIN SODIUM 1 G
1000 VIAL (EA) INJECTION EVERY 8 HOURS
Refills: 0 | Status: COMPLETED | OUTPATIENT
Start: 2023-09-15 | End: 2023-09-18

## 2023-09-15 RX ADMIN — Medication 975 MILLIGRAM(S): at 04:21

## 2023-09-15 RX ADMIN — Medication 975 MILLIGRAM(S): at 21:55

## 2023-09-15 RX ADMIN — Medication 975 MILLIGRAM(S): at 16:14

## 2023-09-15 RX ADMIN — Medication 30 MILLIGRAM(S): at 23:25

## 2023-09-15 RX ADMIN — GABAPENTIN 200 MILLIGRAM(S): 400 CAPSULE ORAL at 17:35

## 2023-09-15 RX ADMIN — SODIUM CHLORIDE 1000 MILLILITER(S): 9 INJECTION, SOLUTION INTRAVENOUS at 19:38

## 2023-09-15 RX ADMIN — SODIUM CHLORIDE 125 MILLILITER(S): 9 INJECTION, SOLUTION INTRAVENOUS at 05:32

## 2023-09-15 RX ADMIN — SENNA PLUS 1 TABLET(S): 8.6 TABLET ORAL at 05:32

## 2023-09-15 RX ADMIN — Medication 100 MILLIGRAM(S): at 05:32

## 2023-09-15 RX ADMIN — Medication 100 MILLIGRAM(S): at 21:55

## 2023-09-15 RX ADMIN — Medication 30 MILLIGRAM(S): at 04:21

## 2023-09-15 RX ADMIN — ENOXAPARIN SODIUM 40 MILLIGRAM(S): 100 INJECTION SUBCUTANEOUS at 11:20

## 2023-09-15 RX ADMIN — Medication 30 MILLIGRAM(S): at 11:21

## 2023-09-15 RX ADMIN — GABAPENTIN 200 MILLIGRAM(S): 400 CAPSULE ORAL at 05:32

## 2023-09-15 RX ADMIN — Medication 88 MICROGRAM(S): at 05:32

## 2023-09-15 RX ADMIN — Medication 30 MILLIGRAM(S): at 17:35

## 2023-09-15 RX ADMIN — SENNA PLUS 1 TABLET(S): 8.6 TABLET ORAL at 17:35

## 2023-09-15 RX ADMIN — Medication 100 MILLIGRAM(S): at 16:15

## 2023-09-15 RX ADMIN — SODIUM CHLORIDE 75 MILLILITER(S): 9 INJECTION, SOLUTION INTRAVENOUS at 11:21

## 2023-09-15 RX ADMIN — SODIUM CHLORIDE 500 MILLILITER(S): 9 INJECTION, SOLUTION INTRAVENOUS at 22:49

## 2023-09-15 NOTE — PHYSICAL THERAPY INITIAL EVALUATION ADULT - GENERAL OBSERVATIONS, REHAB EVAL
pt received/returned semi-supine in bed +heplock, +tele, +YOSHI incision and 4 BERNICE drains C/D/I, +dopplers, c/o incisional pain

## 2023-09-15 NOTE — PHYSICAL THERAPY INITIAL EVALUATION ADULT - ADDITIONAL COMMENTS
pt lives w/ her  in an elevator access apt building w/ 3 steps to enter. Denies use of DME for amb prior to this admission and states that she was independent in all ADLs

## 2023-09-15 NOTE — PHYSICAL THERAPY INITIAL EVALUATION ADULT - PERTINENT HX OF CURRENT PROBLEM, REHAB EVAL
2.11
Patient is a 47 y/o F s/p removal of BL breast implants, capsulectomies and BL YOSHI flap breast reconstruction:

## 2023-09-15 NOTE — PHYSICAL THERAPY INITIAL EVALUATION ADULT - GAIT DEVIATIONS NOTED, PT EVAL
decreased neville/increased time in double stance/decreased step length/decreased weight-shifting ability

## 2023-09-16 LAB
HCT VFR BLD CALC: 30.2 % — LOW (ref 34.5–45)
HGB BLD-MCNC: 9.8 G/DL — LOW (ref 11.5–15.5)
MCHC RBC-ENTMCNC: 30.3 PG — SIGNIFICANT CHANGE UP (ref 27–34)
MCHC RBC-ENTMCNC: 32.5 GM/DL — SIGNIFICANT CHANGE UP (ref 32–36)
MCV RBC AUTO: 93.5 FL — SIGNIFICANT CHANGE UP (ref 80–100)
NRBC # BLD: 0 /100 WBCS — SIGNIFICANT CHANGE UP (ref 0–0)
PLATELET # BLD AUTO: 152 K/UL — SIGNIFICANT CHANGE UP (ref 150–400)
RBC # BLD: 3.23 M/UL — LOW (ref 3.8–5.2)
RBC # FLD: 14.6 % — HIGH (ref 10.3–14.5)
WBC # BLD: 6.1 K/UL — SIGNIFICANT CHANGE UP (ref 3.8–10.5)
WBC # FLD AUTO: 6.1 K/UL — SIGNIFICANT CHANGE UP (ref 3.8–10.5)

## 2023-09-16 RX ORDER — TRAMADOL HYDROCHLORIDE 50 MG/1
50 TABLET ORAL EVERY 6 HOURS
Refills: 0 | Status: DISCONTINUED | OUTPATIENT
Start: 2023-09-16 | End: 2023-09-18

## 2023-09-16 RX ORDER — GABAPENTIN 400 MG/1
300 CAPSULE ORAL THREE TIMES A DAY
Refills: 0 | Status: DISCONTINUED | OUTPATIENT
Start: 2023-09-16 | End: 2023-09-16

## 2023-09-16 RX ORDER — DIAZEPAM 5 MG
5 TABLET ORAL EVERY 8 HOURS
Refills: 0 | Status: DISCONTINUED | OUTPATIENT
Start: 2023-09-16 | End: 2023-09-17

## 2023-09-16 RX ORDER — GABAPENTIN 400 MG/1
200 CAPSULE ORAL THREE TIMES A DAY
Refills: 0 | Status: DISCONTINUED | OUTPATIENT
Start: 2023-09-16 | End: 2023-09-18

## 2023-09-16 RX ADMIN — Medication 30 MILLIGRAM(S): at 11:55

## 2023-09-16 RX ADMIN — Medication 100 MILLIGRAM(S): at 06:36

## 2023-09-16 RX ADMIN — SENNA PLUS 1 TABLET(S): 8.6 TABLET ORAL at 17:49

## 2023-09-16 RX ADMIN — TRAMADOL HYDROCHLORIDE 50 MILLIGRAM(S): 50 TABLET ORAL at 16:05

## 2023-09-16 RX ADMIN — ONDANSETRON 4 MILLIGRAM(S): 8 TABLET, FILM COATED ORAL at 06:57

## 2023-09-16 RX ADMIN — Medication 5 MILLIGRAM(S): at 21:16

## 2023-09-16 RX ADMIN — TRAMADOL HYDROCHLORIDE 50 MILLIGRAM(S): 50 TABLET ORAL at 23:31

## 2023-09-16 RX ADMIN — Medication 100 MILLIGRAM(S): at 21:15

## 2023-09-16 RX ADMIN — GABAPENTIN 200 MILLIGRAM(S): 400 CAPSULE ORAL at 13:45

## 2023-09-16 RX ADMIN — Medication 30 MILLIGRAM(S): at 17:49

## 2023-09-16 RX ADMIN — TRAMADOL HYDROCHLORIDE 50 MILLIGRAM(S): 50 TABLET ORAL at 10:33

## 2023-09-16 RX ADMIN — Medication 30 MILLIGRAM(S): at 06:37

## 2023-09-16 RX ADMIN — Medication 100 MILLIGRAM(S): at 13:45

## 2023-09-16 RX ADMIN — SENNA PLUS 1 TABLET(S): 8.6 TABLET ORAL at 06:38

## 2023-09-16 RX ADMIN — Medication 975 MILLIGRAM(S): at 21:15

## 2023-09-16 RX ADMIN — SODIUM CHLORIDE 75 MILLILITER(S): 9 INJECTION, SOLUTION INTRAVENOUS at 06:36

## 2023-09-16 RX ADMIN — TRAMADOL HYDROCHLORIDE 50 MILLIGRAM(S): 50 TABLET ORAL at 09:33

## 2023-09-16 RX ADMIN — Medication 975 MILLIGRAM(S): at 13:45

## 2023-09-16 RX ADMIN — GABAPENTIN 200 MILLIGRAM(S): 400 CAPSULE ORAL at 06:38

## 2023-09-16 RX ADMIN — GABAPENTIN 200 MILLIGRAM(S): 400 CAPSULE ORAL at 21:15

## 2023-09-16 RX ADMIN — Medication 975 MILLIGRAM(S): at 06:38

## 2023-09-16 RX ADMIN — Medication 5 MILLIGRAM(S): at 04:21

## 2023-09-16 RX ADMIN — TRAMADOL HYDROCHLORIDE 50 MILLIGRAM(S): 50 TABLET ORAL at 17:05

## 2023-09-16 RX ADMIN — TRAMADOL HYDROCHLORIDE 50 MILLIGRAM(S): 50 TABLET ORAL at 22:31

## 2023-09-16 RX ADMIN — Medication 5 MILLIGRAM(S): at 15:25

## 2023-09-16 RX ADMIN — Medication 88 MICROGRAM(S): at 05:35

## 2023-09-17 RX ADMIN — Medication 30 MILLIGRAM(S): at 06:01

## 2023-09-17 RX ADMIN — ONDANSETRON 4 MILLIGRAM(S): 8 TABLET, FILM COATED ORAL at 06:01

## 2023-09-17 RX ADMIN — SENNA PLUS 1 TABLET(S): 8.6 TABLET ORAL at 06:01

## 2023-09-17 RX ADMIN — Medication 100 MILLIGRAM(S): at 22:16

## 2023-09-17 RX ADMIN — TRAMADOL HYDROCHLORIDE 50 MILLIGRAM(S): 50 TABLET ORAL at 10:58

## 2023-09-17 RX ADMIN — Medication 975 MILLIGRAM(S): at 22:16

## 2023-09-17 RX ADMIN — Medication 5 MILLIGRAM(S): at 07:02

## 2023-09-17 RX ADMIN — Medication 30 MILLIGRAM(S): at 00:03

## 2023-09-17 RX ADMIN — Medication 975 MILLIGRAM(S): at 13:55

## 2023-09-17 RX ADMIN — GABAPENTIN 200 MILLIGRAM(S): 400 CAPSULE ORAL at 06:02

## 2023-09-17 RX ADMIN — TRAMADOL HYDROCHLORIDE 50 MILLIGRAM(S): 50 TABLET ORAL at 04:35

## 2023-09-17 RX ADMIN — Medication 30 MILLIGRAM(S): at 12:46

## 2023-09-17 RX ADMIN — GABAPENTIN 200 MILLIGRAM(S): 400 CAPSULE ORAL at 13:55

## 2023-09-17 RX ADMIN — TRAMADOL HYDROCHLORIDE 50 MILLIGRAM(S): 50 TABLET ORAL at 12:00

## 2023-09-17 RX ADMIN — Medication 100 MILLIGRAM(S): at 13:55

## 2023-09-17 RX ADMIN — SENNA PLUS 1 TABLET(S): 8.6 TABLET ORAL at 17:17

## 2023-09-17 RX ADMIN — Medication 975 MILLIGRAM(S): at 06:01

## 2023-09-17 RX ADMIN — GABAPENTIN 200 MILLIGRAM(S): 400 CAPSULE ORAL at 22:15

## 2023-09-17 RX ADMIN — Medication 88 MICROGRAM(S): at 04:34

## 2023-09-17 RX ADMIN — Medication 100 MILLIGRAM(S): at 06:01

## 2023-09-17 RX ADMIN — TRAMADOL HYDROCHLORIDE 50 MILLIGRAM(S): 50 TABLET ORAL at 05:35

## 2023-09-18 ENCOUNTER — TRANSCRIPTION ENCOUNTER (OUTPATIENT)
Age: 48
End: 2023-09-18

## 2023-09-18 VITALS
HEART RATE: 86 BPM | OXYGEN SATURATION: 94 % | TEMPERATURE: 98 F | DIASTOLIC BLOOD PRESSURE: 75 MMHG | SYSTOLIC BLOOD PRESSURE: 112 MMHG | RESPIRATION RATE: 16 BRPM

## 2023-09-18 PROCEDURE — 97116 GAIT TRAINING THERAPY: CPT

## 2023-09-18 PROCEDURE — 97162 PT EVAL MOD COMPLEX 30 MIN: CPT

## 2023-09-18 PROCEDURE — 88304 TISSUE EXAM BY PATHOLOGIST: CPT

## 2023-09-18 PROCEDURE — C1781: CPT

## 2023-09-18 PROCEDURE — 88305 TISSUE EXAM BY PATHOLOGIST: CPT

## 2023-09-18 PROCEDURE — 86900 BLOOD TYPING SEROLOGIC ABO: CPT

## 2023-09-18 PROCEDURE — 86901 BLOOD TYPING SEROLOGIC RH(D): CPT

## 2023-09-18 PROCEDURE — 88300 SURGICAL PATH GROSS: CPT

## 2023-09-18 PROCEDURE — 80048 BASIC METABOLIC PNL TOTAL CA: CPT

## 2023-09-18 PROCEDURE — 85027 COMPLETE CBC AUTOMATED: CPT

## 2023-09-18 PROCEDURE — C1762: CPT

## 2023-09-18 PROCEDURE — 36415 COLL VENOUS BLD VENIPUNCTURE: CPT

## 2023-09-18 PROCEDURE — 86850 RBC ANTIBODY SCREEN: CPT

## 2023-09-18 PROCEDURE — C1889: CPT

## 2023-09-18 RX ORDER — TRAMADOL HYDROCHLORIDE 50 MG/1
1 TABLET ORAL
Qty: 30 | Refills: 0
Start: 2023-09-18 | End: 2023-09-24

## 2023-09-18 RX ORDER — GABAPENTIN 400 MG/1
2 CAPSULE ORAL
Qty: 42 | Refills: 0
Start: 2023-09-18 | End: 2023-09-24

## 2023-09-18 RX ORDER — ENOXAPARIN SODIUM 100 MG/ML
40 INJECTION SUBCUTANEOUS EVERY 24 HOURS
Refills: 0 | Status: DISCONTINUED | OUTPATIENT
Start: 2023-09-18 | End: 2023-09-18

## 2023-09-18 RX ORDER — SENNA PLUS 8.6 MG/1
1 TABLET ORAL
Qty: 0 | Refills: 0 | DISCHARGE
Start: 2023-09-18

## 2023-09-18 RX ADMIN — TRAMADOL HYDROCHLORIDE 50 MILLIGRAM(S): 50 TABLET ORAL at 09:29

## 2023-09-18 RX ADMIN — Medication 975 MILLIGRAM(S): at 05:55

## 2023-09-18 RX ADMIN — TRAMADOL HYDROCHLORIDE 50 MILLIGRAM(S): 50 TABLET ORAL at 10:29

## 2023-09-18 RX ADMIN — TRAMADOL HYDROCHLORIDE 50 MILLIGRAM(S): 50 TABLET ORAL at 04:20

## 2023-09-18 RX ADMIN — ENOXAPARIN SODIUM 40 MILLIGRAM(S): 100 INJECTION SUBCUTANEOUS at 09:29

## 2023-09-18 RX ADMIN — Medication 975 MILLIGRAM(S): at 15:28

## 2023-09-18 RX ADMIN — TRAMADOL HYDROCHLORIDE 50 MILLIGRAM(S): 50 TABLET ORAL at 16:39

## 2023-09-18 RX ADMIN — GABAPENTIN 200 MILLIGRAM(S): 400 CAPSULE ORAL at 15:27

## 2023-09-18 RX ADMIN — SENNA PLUS 1 TABLET(S): 8.6 TABLET ORAL at 05:55

## 2023-09-18 RX ADMIN — Medication 88 MICROGRAM(S): at 05:55

## 2023-09-18 RX ADMIN — GABAPENTIN 200 MILLIGRAM(S): 400 CAPSULE ORAL at 05:55

## 2023-09-18 RX ADMIN — Medication 100 MILLIGRAM(S): at 05:55

## 2023-09-18 RX ADMIN — TRAMADOL HYDROCHLORIDE 50 MILLIGRAM(S): 50 TABLET ORAL at 03:16

## 2023-09-18 NOTE — PROGRESS NOTE ADULT - ASSESSMENT
49 y/o female s/p b/l breast implant and capsule removal and reconstruction with b/l YOSHI flaps  - dc b/l breast drains  - cut cook wires  - OOBA  - dc prevena today  - regular diet  - dc home today
A/P:     Patient is a 47 y/o F s/p removal of BL breast implants, capsulectomies and BL YOSHI flap breast reconstruction:     - q4hr flap checks  - Continue Lovenox/pain medications   - Continue BERNICE drain care and prevena to suction   - OOB ambulate three times minimum today  -Continue working on IS  -Regular diet  - Dc valium, this may be source of her nausea  -planned dc tomorrow      Plastic Surgery 
A/P:     Patient is a 47 y/o F s/p removal of BL breast implants, capsulectomies and BL YOSHI flap breast reconstruction:       - D/C tele monitoring   - q2hr flap checks  - Continue Lovenox/pain medications   - Follow up AM labs   - Continue BERNICE drain care and prevena to suction   - OOB to chair/ambulation with assistance today   - CLD this morning and advance to fulls later   - Dispo planning for Sunday       Plastic Surgery 
A/P:     Patient is a 47 y/o F s/p removal of BL breast implants, capsulectomies and BL YOSHI flap breast reconstruction:     - q4hr flap checks  - Continue Lovenox/pain medications, adjusted to tramadol and increase gabapentin to 200 TID. Continue tylenol and toradol.   - No further labs needed  - Continue BERNICE drain care and prevena to suction   - OOB to chair/ambulation with assistance today   - Regular diet  - Work on IS  - Dispo planning for Sunday       Plastic Surgery

## 2023-09-18 NOTE — DISCHARGE NOTE NURSING/CASE MANAGEMENT/SOCIAL WORK - PATIENT PORTAL LINK FT
You can access the FollowMyHealth Patient Portal offered by [facility] by registering at the following website: [email or web address]. By joining [employer]’s FollowMyHealth portal, you will also be able to view your health information using other applications (apps) compatible with our system.

## 2023-09-18 NOTE — DISCHARGE NOTE NURSING/CASE MANAGEMENT/SOCIAL WORK - NSDCPEFALRISK_GEN_ALL_CORE
For information on Fall & Injury Prevention, visit: [email or web address] OR  [email or web address] OR  https://www.cdc.gov/steadi/patient.html

## 2023-09-18 NOTE — PROGRESS NOTE ADULT - SUBJECTIVE AND OBJECTIVE BOX
NADYA REYES  [id number]    Subjective:    Patient seen and examined, pain in abdomen but improved with medication. Mild lightheadness with ambulation yesterday improved following bolus x2. BPs 90s-100s systolic. Repeat CBC with Hct 30.2 from 33.7 yesterday. Complaining of chest pain with radiation to shoulders, EKG sinus and without concerns. Voided on bedpan yesterday. Got up to chair with nurse this morning. IS low, encouraged.       PHYSICAL EXAM:    >> General: NAD   >> Cardiovascular: Non tachycardic  >> Lungs: Comfortable on room air  >> Breasts: Breast soft bilaterally with no concern of collection, flaps both viable and non congested. Arterial Pencil doppler+ bilaterally. BERNICE drains serosang.   >> Abdomen: Soft, appropriately tender, prevena holding suction to wound vac, BERNICE drains serosang  >> Extremities: Unremarkable.             MEDICATIONS  (STANDING):  acetaminophen     Tablet .. 975 milliGRAM(s) Oral every 8 hours  ceFAZolin   IVPB 1000 milliGRAM(s) IV Intermittent every 8 hours  enoxaparin Injectable 40 milliGRAM(s) SubCutaneous every 24 hours  gabapentin 200 milliGRAM(s) Oral two times a day  ketorolac   Injectable 30 milliGRAM(s) IV Push every 6 hours  lactated ringers. 1000 milliLiter(s) (75 mL/Hr) IV Continuous <Continuous>  levothyroxine 88 MICROGram(s) Oral daily  senna 1 Tablet(s) Oral every 12 hours    MEDICATIONS  (PRN):  diazepam    Tablet 5 milliGRAM(s) Oral every 8 hours PRN Muscle spasms  HYDROmorphone  Injectable 0.5 milliGRAM(s) IV Push every 15 minutes PRN Severe Pain (7 - 10)  metoclopramide Injectable 10 milliGRAM(s) IV Push once PRN Nausea/vomiting  ondansetron Injectable 4 milliGRAM(s) IV Push every 6 hours PRN Nausea and/or Vomiting  oxyCODONE    IR 5 milliGRAM(s) Oral every 4 hours PRN Moderate Pain (4 - 6)  oxyCODONE    IR 10 milliGRAM(s) Oral every 4 hours PRN Severe Pain (7 - 10)             
SUBJECTIVE:  Doing well.   No overnight events. Continues to complain of pain but states medication change was helpful.    OBJECTIVE:     ** VITAL SIGNS / I&O's **    Vital Signs Last 24 Hrs  T(C): 36.9 (18 Sep 2023 04:35), Max: 37.3 (18 Sep 2023 00:30)  T(F): 98.5 (18 Sep 2023 04:35), Max: 99.2 (18 Sep 2023 00:30)  HR: 95 (18 Sep 2023 04:35) (93 - 100)  BP: 115/61 (18 Sep 2023 04:35) (110/76 - 121/59)  BP(mean): --  RR: 16 (18 Sep 2023 04:35) (16 - 18)  SpO2: 93% (18 Sep 2023 04:35) (93% - 96%)    Parameters below as of 18 Sep 2023 04:35  Patient On (Oxygen Delivery Method): room air          17 Sep 2023 07:01  -  18 Sep 2023 07:00  --------------------------------------------------------  IN:    IV PiggyBack: 100 mL    Oral Fluid: 680 mL  Total IN: 780 mL    OUT:    Bulb (mL): 75 mL    Bulb (mL): 14.5 mL    Bulb (mL): 17.5 mL    Bulb (mL): 17.5 mL    Voided (mL): 1800 mL  Total OUT: 1924.5 mL    Total NET: -1144.5 mL          ** PHYSICAL EXAM **    -- CONSTITUTIONAL: AOx3. NAD.   -- BREASTS: B/L soft, no collections, skin paddle good color, + doppler signal, JPs serosanguinous  -- RESPIRATORY: unlabored, no respiratory distress  -- ABDOMEN: Soft. No collections.vac holding suction, Umbilicus viable. BERNICE's serosanguinous.    ** LABS**              CAPILLARY BLOOD GLUCOSE            
TERESA REYES  [id number]    Subjective:    Patient seen and examined, pain in abdomen but improved with medication. Had some nausea and vomiting this morning, improved after breakfast. Has been doing well getting up and ambulating. Voiding without issue. Pain overall much improved.         PHYSICAL EXAM:    >> General: NAD   >> Cardiovascular: Non tachycardic  >> Lungs: Comfortable on room air  >> Breasts: Breast soft bilaterally with no concern of collection, flaps both viable and non congested. Arterial Pencil doppler+ bilaterally. BERNICE drains serosang  >> Abdomen: Soft, appropriately tender, prevena holding suction to wound vac, BERNICE drains serosang  >> Extremities: Unremarkable.            
NADYA REYES  [id number]    Subjective:    Patient seen and examined, pain in abdomen but improved with medication. Sleeping comfortable upon exam.        Objective:  T(C): 36.7 (09-15-23 @ 04:24), Max: 36.7 (09-14-23 @ 23:37)  HR: 90 (09-15-23 @ 04:24) (83 - 108)  BP: 115/61 (09-15-23 @ 04:24) (115/61 - 139/68)  RR: 18 (09-15-23 @ 04:24) (16 - 19)  SpO2: 97% (09-15-23 @ 04:24) (94% - 100%)  Wt(kg): --           09-14 @ 07:01  -  09-15 @ 07:00  --------------------------------------------------------  IN: 425 mL / OUT: 2370 mL / NET: -1945 mL      PHYSICAL EXAM:    >> General: NAD   >> Cardiovascular: Non tachycardic  >> Lungs: Comfortable on room air  >> Breasts: Breast soft bilaterally with no concern of collection, flaps both viable and non congested. Arterial Pencil doppler+ bilaterally. BERNICE drains serosang  >> Abdomen: Soft, appropriately tender, prevena holding suction to wound vac, BERNICE drains serosang  >> Extremities: Unremarkable.             MEDICATIONS  (STANDING):  acetaminophen     Tablet .. 975 milliGRAM(s) Oral every 8 hours  ceFAZolin   IVPB 1000 milliGRAM(s) IV Intermittent every 8 hours  enoxaparin Injectable 40 milliGRAM(s) SubCutaneous every 24 hours  gabapentin 200 milliGRAM(s) Oral two times a day  ketorolac   Injectable 30 milliGRAM(s) IV Push every 6 hours  lactated ringers. 1000 milliLiter(s) (75 mL/Hr) IV Continuous <Continuous>  levothyroxine 88 MICROGram(s) Oral daily  senna 1 Tablet(s) Oral every 12 hours    MEDICATIONS  (PRN):  diazepam    Tablet 5 milliGRAM(s) Oral every 8 hours PRN Muscle spasms  HYDROmorphone  Injectable 0.5 milliGRAM(s) IV Push every 15 minutes PRN Severe Pain (7 - 10)  metoclopramide Injectable 10 milliGRAM(s) IV Push once PRN Nausea/vomiting  ondansetron Injectable 4 milliGRAM(s) IV Push every 6 hours PRN Nausea and/or Vomiting  oxyCODONE    IR 5 milliGRAM(s) Oral every 4 hours PRN Moderate Pain (4 - 6)  oxyCODONE    IR 10 milliGRAM(s) Oral every 4 hours PRN Severe Pain (7 - 10)

## 2023-09-19 RX ORDER — ASPIRIN/CALCIUM CARB/MAGNESIUM 324 MG
1 TABLET ORAL
Qty: 0 | Refills: 0 | DISCHARGE
Start: 2023-09-19 | End: 2023-09-29

## 2023-09-20 ENCOUNTER — APPOINTMENT (OUTPATIENT)
Dept: PLASTIC SURGERY | Facility: CLINIC | Age: 48
End: 2023-09-20
Payer: MEDICAID

## 2023-09-20 PROCEDURE — 99024 POSTOP FOLLOW-UP VISIT: CPT

## 2023-09-21 PROBLEM — Z86.79 PERSONAL HISTORY OF OTHER DISEASES OF THE CIRCULATORY SYSTEM: Chronic | Status: ACTIVE | Noted: 2023-09-13

## 2023-09-21 PROBLEM — I10 ESSENTIAL (PRIMARY) HYPERTENSION: Chronic | Status: ACTIVE | Noted: 2023-09-13

## 2023-09-23 DIAGNOSIS — N65.0 DEFORMITY OF RECONSTRUCTED BREAST: ICD-10-CM

## 2023-09-23 DIAGNOSIS — G43.909 MIGRAINE, UNSPECIFIED, NOT INTRACTABLE, WITHOUT STATUS MIGRAINOSUS: ICD-10-CM

## 2023-09-23 DIAGNOSIS — M95.4 ACQUIRED DEFORMITY OF CHEST AND RIB: ICD-10-CM

## 2023-09-23 DIAGNOSIS — D36.14 BENIGN NEOPLASM OF PERIPHERAL NERVES AND AUTONOMIC NERVOUS SYSTEM OF THORAX: ICD-10-CM

## 2023-09-23 DIAGNOSIS — D64.9 ANEMIA, UNSPECIFIED: ICD-10-CM

## 2023-09-23 DIAGNOSIS — E03.9 HYPOTHYROIDISM, UNSPECIFIED: ICD-10-CM

## 2023-09-23 DIAGNOSIS — Z85.3 PERSONAL HISTORY OF MALIGNANT NEOPLASM OF BREAST: ICD-10-CM

## 2023-09-23 DIAGNOSIS — Z79.890 HORMONE REPLACEMENT THERAPY: ICD-10-CM

## 2023-09-23 DIAGNOSIS — Z90.13 ACQUIRED ABSENCE OF BILATERAL BREASTS AND NIPPLES: ICD-10-CM

## 2023-09-23 DIAGNOSIS — I10 ESSENTIAL (PRIMARY) HYPERTENSION: ICD-10-CM

## 2023-09-23 DIAGNOSIS — E78.5 HYPERLIPIDEMIA, UNSPECIFIED: ICD-10-CM

## 2023-09-25 ENCOUNTER — NON-APPOINTMENT (OUTPATIENT)
Age: 48
End: 2023-09-25

## 2023-09-26 ENCOUNTER — TRANSCRIPTION ENCOUNTER (OUTPATIENT)
Age: 48
End: 2023-09-26

## 2023-09-27 LAB — SURGICAL PATHOLOGY STUDY: SIGNIFICANT CHANGE UP

## 2023-09-29 ENCOUNTER — APPOINTMENT (OUTPATIENT)
Dept: PLASTIC SURGERY | Facility: CLINIC | Age: 48
End: 2023-09-29

## 2023-10-05 DIAGNOSIS — R26.2 DIFFICULTY IN WALKING, NOT ELSEWHERE CLASSIFIED: ICD-10-CM

## 2023-10-10 ENCOUNTER — APPOINTMENT (OUTPATIENT)
Dept: PLASTIC SURGERY | Facility: CLINIC | Age: 48
End: 2023-10-10
Payer: MEDICAID

## 2023-10-10 VITALS — HEART RATE: 81 BPM | HEIGHT: 61 IN | OXYGEN SATURATION: 99 %

## 2023-10-10 PROCEDURE — 99024 POSTOP FOLLOW-UP VISIT: CPT

## 2023-10-10 RX ORDER — OXYCODONE 5 MG/1
5 TABLET ORAL
Qty: 12 | Refills: 0 | Status: DISCONTINUED | COMMUNITY
Start: 2023-09-06 | End: 2023-10-10

## 2023-10-10 RX ORDER — NERATINIB 40 MG/1
40 TABLET ORAL
Refills: 0 | Status: ACTIVE | COMMUNITY

## 2023-10-10 RX ORDER — DIAZEPAM 5 MG/1
5 TABLET ORAL
Qty: 15 | Refills: 0 | Status: DISCONTINUED | COMMUNITY
Start: 2023-09-06 | End: 2023-10-10

## 2023-10-10 RX ORDER — ASPIRIN 325 MG/1
325 TABLET, FILM COATED ORAL
Qty: 10 | Refills: 0 | Status: DISCONTINUED | COMMUNITY
Start: 2023-09-06 | End: 2023-10-10

## 2023-12-05 ENCOUNTER — APPOINTMENT (OUTPATIENT)
Dept: PLASTIC SURGERY | Facility: CLINIC | Age: 48
End: 2023-12-05
Payer: MEDICAID

## 2023-12-05 PROCEDURE — 99024 POSTOP FOLLOW-UP VISIT: CPT

## 2023-12-29 ENCOUNTER — APPOINTMENT (OUTPATIENT)
Dept: ORTHOPEDIC SURGERY | Facility: CLINIC | Age: 48
End: 2023-12-29

## 2023-12-29 NOTE — HISTORY OF PRESENT ILLNESS
[FreeTextEntry1] : The patient is following back up with me regarding her left carpal tunnel syndrome and index finger dorsal soft tissue mass.  I recommended an initial attempt at conservative treatment with carpal tunnel night splint wear, which she has __ .   An ultrasound over the dorsum of the left index MCP joint was obtained on July 27, 2023, findings were supportive of a retinacular/ganglion cyst over the dorsal aspect of the MCP joint. An EMG/NCS was __  The patient did not show up to her previously scheduled August 11, 2023 visit.

## 2023-12-29 NOTE — PHYSICAL EXAM
[de-identified] : Full, symmetric digital ROM. There is a 1 x 2 mm soft tissue mass over the dorsal aspect of the left index finger MCP joint, directly over the index EDC tendon. The soft tissue mass is tender to palpation. No overlying redness or skin changes. The soft tissue mass is mobile. It moves with active left index finger flexion and extension.  There is good capillary refill of the digits bilaterally. There are no masses palpated or sensitivity over the median and ulnar nerves at the level of the wrist. There is a positive Tinel's over the left carpal tunnel as well as a positive Phalen's/carpal tunnel compression test. There is full 5/5 strength at the left APB, ADM and FDI. No atrophy. There is subjective numbness and decreased sensation along the radial and ulnar aspects of the thumb through ring finger..

## 2023-12-31 NOTE — SURGICAL HISTORY
[de-identified] :  9/14/23 - bilateral delayed YOSHI flap reconstruction, removal of breast iplants

## 2023-12-31 NOTE — HISTORY OF PRESENT ILLNESS
[FreeTextEntry1] : 49 y/o female 3 months PO s/p Bilateral delayed YOSHI flap reconstruction on 9/14/23. Denies any f/c/n/v. Patient is taking Tylenol for the pain. Patient c/o pain on her right abdomen. Patient saw a therapist for lymphedema massage on 11/14. She is seeing a PT at [facility] on 01/08/2024.

## 2023-12-31 NOTE — PHYSICAL EXAM
[de-identified] : Incisions well healed, no collections or s/sx of infection, flaps warm, viable [de-identified] : Incisions well healed, no collections or s/sx of infection, umbo warm, viable

## 2023-12-31 NOTE — ASSESSMENT
[FreeTextEntry1] : Healing well, no sign of any abnormality on exam of ab wall, no hernia, no collections,  Rx given for PT / Massage - reassured patient this is all part of normal helaing process.  Sports bra/ binder  RTC in March 2024 to discuss revision surgery

## 2024-01-01 ENCOUNTER — EMERGENCY (EMERGENCY)
Facility: HOSPITAL | Age: 49
LOS: 1 days | Discharge: HOME | End: 2024-01-01
Attending: EMERGENCY MEDICINE
Payer: COMMERCIAL

## 2024-01-01 VITALS
RESPIRATION RATE: 17 BRPM | OXYGEN SATURATION: 96 % | TEMPERATURE: 98 F | SYSTOLIC BLOOD PRESSURE: 105 MMHG | HEART RATE: 83 BPM | DIASTOLIC BLOOD PRESSURE: 67 MMHG

## 2024-01-01 VITALS
HEIGHT: 61 IN | WEIGHT: 160.06 LBS | TEMPERATURE: 98 F | DIASTOLIC BLOOD PRESSURE: 82 MMHG | OXYGEN SATURATION: 98 % | HEART RATE: 90 BPM | RESPIRATION RATE: 18 BRPM | SYSTOLIC BLOOD PRESSURE: 118 MMHG

## 2024-01-01 DIAGNOSIS — Z86.79 PERSONAL HISTORY OF OTHER DISEASES OF THE CIRCULATORY SYSTEM: Chronic | ICD-10-CM

## 2024-01-01 DIAGNOSIS — Z90.13 ACQUIRED ABSENCE OF BILATERAL BREASTS AND NIPPLES: Chronic | ICD-10-CM

## 2024-01-01 DIAGNOSIS — N60.01 SOLITARY CYST OF RIGHT BREAST: Chronic | ICD-10-CM

## 2024-01-01 DIAGNOSIS — Z90.710 ACQUIRED ABSENCE OF BOTH CERVIX AND UTERUS: Chronic | ICD-10-CM

## 2024-01-01 LAB
ALBUMIN SERPL ELPH-MCNC: 4 G/DL — SIGNIFICANT CHANGE UP (ref 3.5–5)
ALBUMIN SERPL ELPH-MCNC: 4 G/DL — SIGNIFICANT CHANGE UP (ref 3.5–5)
ALP SERPL-CCNC: 91 U/L — SIGNIFICANT CHANGE UP (ref 40–120)
ALP SERPL-CCNC: 91 U/L — SIGNIFICANT CHANGE UP (ref 40–120)
ALT FLD-CCNC: 29 U/L DA — SIGNIFICANT CHANGE UP (ref 10–60)
ALT FLD-CCNC: 29 U/L DA — SIGNIFICANT CHANGE UP (ref 10–60)
ANION GAP SERPL CALC-SCNC: 5 MMOL/L — SIGNIFICANT CHANGE UP (ref 5–17)
ANION GAP SERPL CALC-SCNC: 5 MMOL/L — SIGNIFICANT CHANGE UP (ref 5–17)
APTT BLD: 29.1 SEC — SIGNIFICANT CHANGE UP (ref 24.5–35.6)
APTT BLD: 29.1 SEC — SIGNIFICANT CHANGE UP (ref 24.5–35.6)
AST SERPL-CCNC: 29 U/L — SIGNIFICANT CHANGE UP (ref 10–40)
AST SERPL-CCNC: 29 U/L — SIGNIFICANT CHANGE UP (ref 10–40)
BASOPHILS # BLD AUTO: 0.01 K/UL — SIGNIFICANT CHANGE UP (ref 0–0.2)
BASOPHILS # BLD AUTO: 0.01 K/UL — SIGNIFICANT CHANGE UP (ref 0–0.2)
BASOPHILS NFR BLD AUTO: 0.1 % — SIGNIFICANT CHANGE UP (ref 0–2)
BASOPHILS NFR BLD AUTO: 0.1 % — SIGNIFICANT CHANGE UP (ref 0–2)
BILIRUB SERPL-MCNC: 0.8 MG/DL — SIGNIFICANT CHANGE UP (ref 0.2–1.2)
BILIRUB SERPL-MCNC: 0.8 MG/DL — SIGNIFICANT CHANGE UP (ref 0.2–1.2)
BUN SERPL-MCNC: 23 MG/DL — HIGH (ref 7–18)
BUN SERPL-MCNC: 23 MG/DL — HIGH (ref 7–18)
CALCIUM SERPL-MCNC: 8.3 MG/DL — LOW (ref 8.4–10.5)
CALCIUM SERPL-MCNC: 8.3 MG/DL — LOW (ref 8.4–10.5)
CHLORIDE SERPL-SCNC: 106 MMOL/L — SIGNIFICANT CHANGE UP (ref 96–108)
CHLORIDE SERPL-SCNC: 106 MMOL/L — SIGNIFICANT CHANGE UP (ref 96–108)
CO2 SERPL-SCNC: 27 MMOL/L — SIGNIFICANT CHANGE UP (ref 22–31)
CO2 SERPL-SCNC: 27 MMOL/L — SIGNIFICANT CHANGE UP (ref 22–31)
CREAT SERPL-MCNC: 0.82 MG/DL — SIGNIFICANT CHANGE UP (ref 0.5–1.3)
CREAT SERPL-MCNC: 0.82 MG/DL — SIGNIFICANT CHANGE UP (ref 0.5–1.3)
EGFR: 89 ML/MIN/1.73M2 — SIGNIFICANT CHANGE UP
EGFR: 89 ML/MIN/1.73M2 — SIGNIFICANT CHANGE UP
EOSINOPHIL # BLD AUTO: 0.03 K/UL — SIGNIFICANT CHANGE UP (ref 0–0.5)
EOSINOPHIL # BLD AUTO: 0.03 K/UL — SIGNIFICANT CHANGE UP (ref 0–0.5)
EOSINOPHIL NFR BLD AUTO: 0.4 % — SIGNIFICANT CHANGE UP (ref 0–6)
EOSINOPHIL NFR BLD AUTO: 0.4 % — SIGNIFICANT CHANGE UP (ref 0–6)
GLUCOSE SERPL-MCNC: 102 MG/DL — HIGH (ref 70–99)
GLUCOSE SERPL-MCNC: 102 MG/DL — HIGH (ref 70–99)
HCG SERPL-ACNC: <1 MIU/ML — SIGNIFICANT CHANGE UP
HCG SERPL-ACNC: <1 MIU/ML — SIGNIFICANT CHANGE UP
HCT VFR BLD CALC: 43.3 % — SIGNIFICANT CHANGE UP (ref 34.5–45)
HCT VFR BLD CALC: 43.3 % — SIGNIFICANT CHANGE UP (ref 34.5–45)
HGB BLD-MCNC: 14 G/DL — SIGNIFICANT CHANGE UP (ref 11.5–15.5)
HGB BLD-MCNC: 14 G/DL — SIGNIFICANT CHANGE UP (ref 11.5–15.5)
IMM GRANULOCYTES NFR BLD AUTO: 0.3 % — SIGNIFICANT CHANGE UP (ref 0–0.9)
IMM GRANULOCYTES NFR BLD AUTO: 0.3 % — SIGNIFICANT CHANGE UP (ref 0–0.9)
INR BLD: 0.96 RATIO — SIGNIFICANT CHANGE UP (ref 0.85–1.18)
INR BLD: 0.96 RATIO — SIGNIFICANT CHANGE UP (ref 0.85–1.18)
LACTATE SERPL-SCNC: 1.2 MMOL/L — SIGNIFICANT CHANGE UP (ref 0.7–2)
LACTATE SERPL-SCNC: 1.2 MMOL/L — SIGNIFICANT CHANGE UP (ref 0.7–2)
LIDOCAIN IGE QN: 23 U/L — SIGNIFICANT CHANGE UP (ref 13–75)
LIDOCAIN IGE QN: 23 U/L — SIGNIFICANT CHANGE UP (ref 13–75)
LYMPHOCYTES # BLD AUTO: 0.48 K/UL — LOW (ref 1–3.3)
LYMPHOCYTES # BLD AUTO: 0.48 K/UL — LOW (ref 1–3.3)
LYMPHOCYTES # BLD AUTO: 6.5 % — LOW (ref 13–44)
LYMPHOCYTES # BLD AUTO: 6.5 % — LOW (ref 13–44)
MCHC RBC-ENTMCNC: 28.6 PG — SIGNIFICANT CHANGE UP (ref 27–34)
MCHC RBC-ENTMCNC: 28.6 PG — SIGNIFICANT CHANGE UP (ref 27–34)
MCHC RBC-ENTMCNC: 32.3 GM/DL — SIGNIFICANT CHANGE UP (ref 32–36)
MCHC RBC-ENTMCNC: 32.3 GM/DL — SIGNIFICANT CHANGE UP (ref 32–36)
MCV RBC AUTO: 88.4 FL — SIGNIFICANT CHANGE UP (ref 80–100)
MCV RBC AUTO: 88.4 FL — SIGNIFICANT CHANGE UP (ref 80–100)
MONOCYTES # BLD AUTO: 0.25 K/UL — SIGNIFICANT CHANGE UP (ref 0–0.9)
MONOCYTES # BLD AUTO: 0.25 K/UL — SIGNIFICANT CHANGE UP (ref 0–0.9)
MONOCYTES NFR BLD AUTO: 3.4 % — SIGNIFICANT CHANGE UP (ref 2–14)
MONOCYTES NFR BLD AUTO: 3.4 % — SIGNIFICANT CHANGE UP (ref 2–14)
NEUTROPHILS # BLD AUTO: 6.55 K/UL — SIGNIFICANT CHANGE UP (ref 1.8–7.4)
NEUTROPHILS # BLD AUTO: 6.55 K/UL — SIGNIFICANT CHANGE UP (ref 1.8–7.4)
NEUTROPHILS NFR BLD AUTO: 89.3 % — HIGH (ref 43–77)
NEUTROPHILS NFR BLD AUTO: 89.3 % — HIGH (ref 43–77)
NRBC # BLD: 0 /100 WBCS — SIGNIFICANT CHANGE UP (ref 0–0)
NRBC # BLD: 0 /100 WBCS — SIGNIFICANT CHANGE UP (ref 0–0)
PLATELET # BLD AUTO: 220 K/UL — SIGNIFICANT CHANGE UP (ref 150–400)
PLATELET # BLD AUTO: 220 K/UL — SIGNIFICANT CHANGE UP (ref 150–400)
POTASSIUM SERPL-MCNC: 3.4 MMOL/L — LOW (ref 3.5–5.3)
POTASSIUM SERPL-MCNC: 3.4 MMOL/L — LOW (ref 3.5–5.3)
POTASSIUM SERPL-SCNC: 3.4 MMOL/L — LOW (ref 3.5–5.3)
POTASSIUM SERPL-SCNC: 3.4 MMOL/L — LOW (ref 3.5–5.3)
PROT SERPL-MCNC: 7.8 G/DL — SIGNIFICANT CHANGE UP (ref 6–8.3)
PROT SERPL-MCNC: 7.8 G/DL — SIGNIFICANT CHANGE UP (ref 6–8.3)
PROTHROM AB SERPL-ACNC: 11 SEC — SIGNIFICANT CHANGE UP (ref 9.5–13)
PROTHROM AB SERPL-ACNC: 11 SEC — SIGNIFICANT CHANGE UP (ref 9.5–13)
RBC # BLD: 4.9 M/UL — SIGNIFICANT CHANGE UP (ref 3.8–5.2)
RBC # BLD: 4.9 M/UL — SIGNIFICANT CHANGE UP (ref 3.8–5.2)
RBC # FLD: 13.8 % — SIGNIFICANT CHANGE UP (ref 10.3–14.5)
RBC # FLD: 13.8 % — SIGNIFICANT CHANGE UP (ref 10.3–14.5)
SODIUM SERPL-SCNC: 138 MMOL/L — SIGNIFICANT CHANGE UP (ref 135–145)
SODIUM SERPL-SCNC: 138 MMOL/L — SIGNIFICANT CHANGE UP (ref 135–145)
WBC # BLD: 7.34 K/UL — SIGNIFICANT CHANGE UP (ref 3.8–10.5)
WBC # BLD: 7.34 K/UL — SIGNIFICANT CHANGE UP (ref 3.8–10.5)
WBC # FLD AUTO: 7.34 K/UL — SIGNIFICANT CHANGE UP (ref 3.8–10.5)
WBC # FLD AUTO: 7.34 K/UL — SIGNIFICANT CHANGE UP (ref 3.8–10.5)

## 2024-01-01 PROCEDURE — 86901 BLOOD TYPING SEROLOGIC RH(D): CPT

## 2024-01-01 PROCEDURE — 74177 CT ABD & PELVIS W/CONTRAST: CPT | Mod: MA

## 2024-01-01 PROCEDURE — 99284 EMERGENCY DEPT VISIT MOD MDM: CPT | Mod: 25

## 2024-01-01 PROCEDURE — 96374 THER/PROPH/DIAG INJ IV PUSH: CPT | Mod: XU

## 2024-01-01 PROCEDURE — 36415 COLL VENOUS BLD VENIPUNCTURE: CPT

## 2024-01-01 PROCEDURE — 85025 COMPLETE CBC W/AUTO DIFF WBC: CPT

## 2024-01-01 PROCEDURE — 99285 EMERGENCY DEPT VISIT HI MDM: CPT

## 2024-01-01 PROCEDURE — 85730 THROMBOPLASTIN TIME PARTIAL: CPT

## 2024-01-01 PROCEDURE — 83605 ASSAY OF LACTIC ACID: CPT

## 2024-01-01 PROCEDURE — 74177 CT ABD & PELVIS W/CONTRAST: CPT | Mod: 26,MA

## 2024-01-01 PROCEDURE — 84702 CHORIONIC GONADOTROPIN TEST: CPT

## 2024-01-01 PROCEDURE — 83690 ASSAY OF LIPASE: CPT

## 2024-01-01 PROCEDURE — 86900 BLOOD TYPING SEROLOGIC ABO: CPT

## 2024-01-01 PROCEDURE — 85610 PROTHROMBIN TIME: CPT

## 2024-01-01 PROCEDURE — 86850 RBC ANTIBODY SCREEN: CPT

## 2024-01-01 PROCEDURE — 80053 COMPREHEN METABOLIC PANEL: CPT

## 2024-01-01 PROCEDURE — 96375 TX/PRO/DX INJ NEW DRUG ADDON: CPT

## 2024-01-01 RX ORDER — KETOROLAC TROMETHAMINE 30 MG/ML
15 SYRINGE (ML) INJECTION ONCE
Refills: 0 | Status: COMPLETED | OUTPATIENT
Start: 2024-01-01 | End: 2024-01-01

## 2024-01-01 RX ORDER — MORPHINE SULFATE 50 MG/1
4 CAPSULE, EXTENDED RELEASE ORAL ONCE
Refills: 0 | Status: DISCONTINUED | OUTPATIENT
Start: 2024-01-01 | End: 2024-01-01

## 2024-01-01 RX ORDER — ONDANSETRON 8 MG/1
4 TABLET, FILM COATED ORAL ONCE
Refills: 0 | Status: COMPLETED | OUTPATIENT
Start: 2024-01-01 | End: 2024-01-01

## 2024-01-01 RX ORDER — SODIUM CHLORIDE 9 MG/ML
1000 INJECTION INTRAMUSCULAR; INTRAVENOUS; SUBCUTANEOUS ONCE
Refills: 0 | Status: COMPLETED | OUTPATIENT
Start: 2024-01-01 | End: 2024-01-01

## 2024-01-01 RX ADMIN — ONDANSETRON 4 MILLIGRAM(S): 8 TABLET, FILM COATED ORAL at 20:44

## 2024-01-01 RX ADMIN — SODIUM CHLORIDE 1000 MILLILITER(S): 9 INJECTION INTRAMUSCULAR; INTRAVENOUS; SUBCUTANEOUS at 20:43

## 2024-01-01 RX ADMIN — Medication 30 MILLILITER(S): at 20:45

## 2024-01-01 RX ADMIN — MORPHINE SULFATE 4 MILLIGRAM(S): 50 CAPSULE, EXTENDED RELEASE ORAL at 20:45

## 2024-01-01 NOTE — ED PROVIDER NOTE - PATIENT PORTAL LINK FT
You can access the FollowMyHealth Patient Portal offered by [facility] by registering at the following website: [email or web address]. By joining [employer]’s FollowMyHealth portal, you will also be able to view your health information using other applications (apps) compatible with our system. You can access the FollowMyHealth Patient Portal offered by [facility] by registering at the following website: [email or web address]. By joining [employer]’s FollowMyHealth portal, you will also be able to view your health information using other applications (apps) compatible with our system.

## 2024-01-01 NOTE — ED PROVIDER NOTE - OBJECTIVE STATEMENT
48-year-old female past medical history of breast cancer presents for evaluation of diffuse crampy abdominal pain x 1 day associated with watery nonbloody diarrhea and nausea.  Denies fever but did have some chills.  Denies other acute complaints

## 2024-01-01 NOTE — ED PROVIDER NOTE - PHYSICAL EXAMINATION
GENERAL: well appearing, no acute distress   HEAD: atraumatic   EYES: EOMI   ENT: moist oral mucosa   CARDIAC: regular rate  RESPIRATORY: no increased work of breathing  Abdo: Soft with mild diffuse tenderness to palpation no rebound or guarding  MUSCULOSKELETAL: no deformity   NEUROLOGICAL: alert, spontaneous movement of extremities   SKIN: no visible rash  PSYCHIATRIC: cooperative

## 2024-01-01 NOTE — ED PROVIDER NOTE - CLINICAL SUMMARY MEDICAL DECISION MAKING FREE TEXT BOX
48-year-old female with 1 day of diarrhea and abdominal cramping    Patient appears well  Diffuse abdominal tenderness on exam    Assessment: Colitis versus gastroenteritis versus other    Plan: Labs, supportive care, CT, reassess

## 2024-01-01 NOTE — ED ADULT NURSE NOTE - NSFALLUNIVINTERV_ED_ALL_ED
Bed/Stretcher in lowest position, wheels locked, appropriate side rails in place/Call bell, personal items and telephone in reach/Instruct patient to call for assistance before getting out of bed/chair/stretcher/Non-slip footwear applied when patient is off stretcher/[place] to call system/Physically safe environment - no spills, clutter or unnecessary equipment/Purposeful proactive rounding/Room/bathroom lighting operational, light cord in reach Bed/Stretcher in lowest position, wheels locked, appropriate side rails in place/Call bell, personal items and telephone in reach/Instruct patient to call for assistance before getting out of bed/chair/stretcher/Non-slip footwear applied when patient is off stretcher/[place] to call system/Physically safe environment - no spills, clutter or unnecessary equipment/Purposeful proactive rounding/Room/bathroom lighting operational, light cord in reach

## 2024-01-01 NOTE — ED ADULT NURSE NOTE - OBJECTIVE STATEMENT
66 yo man with a history of HTN, paraplegia, seizure disorder, and incontinence requiring chronic [place] condom cath presents for removal of gentile. As per patient, uses a condom catheter, but does sometimes self-cath as well. Has all supplies at home that are necessary. Remove gentile, check UA. Discussed with urology- will need follow-up in clinic.
Received pt with c/o generalized abd pain, n/v, diarrhea, dysuria, and burning on urination. Pt is A&Ox4. Ambulates independently. Skin warm, dry, intact. Resps reg, nonlabored. Denies SOB, CP, palpitations, hematuria or bloody stool. IV heplock inserted to RAC. Labs collected as ordered. Meds given as ordered. Family member at bedside. NAD at this time. Safety maintained.

## 2024-01-30 ENCOUNTER — APPOINTMENT (OUTPATIENT)
Dept: PLASTIC SURGERY | Facility: CLINIC | Age: 49
End: 2024-01-30
Payer: MEDICAID

## 2024-01-30 DIAGNOSIS — T85.44XA CAPSULAR CONTRACTURE OF BREAST IMPLANT, INITIAL ENCOUNTER: ICD-10-CM

## 2024-01-30 PROCEDURE — 99213 OFFICE O/P EST LOW 20 MIN: CPT

## 2024-01-30 NOTE — HISTORY OF PRESENT ILLNESS
[FreeTextEntry1] : 47 y/o female presents 4 months s/p Bilateral delayed YOSHI flap reconstruction on 9/14/23. Denies any f/c/n/v. Patient is taking Tylenol for the pain. She is seeing a PT at [facility] physical therapy on 01/08/2024. Patient states her  pain is getting better. Patient is here to discuss second stage of surgery.

## 2024-01-30 NOTE — PHYSICAL EXAM
[de-identified] : Incisions well healed, no collections or s/sx of infection, flaps warm, viable. Right bigger than left, b/l NAC surgically removed [de-identified] : Incisions well healed, no collections or s/sx of infection, umbo warm, viable. +Bilateral dog ears

## 2024-01-30 NOTE — REASON FOR VISIT
[Post Op: _________] : a [unfilled] post op visit [FreeTextEntry1] : Dr. Onofre [Follow-Up: _____] : a [unfilled] follow-up visit

## 2024-01-30 NOTE — SURGICAL HISTORY
[de-identified] :  9/14/23 - bilateral delayed YOSHI flap reconstruction, removal of breast iplants

## 2024-01-30 NOTE — ASSESSMENT
[FreeTextEntry1] : Well healed Ready for second stage revision. Reviewed plan for surgery, risks and benefits of: 1) bilateral nipple reconstruction 2)  BL revision with vertical mastopexy skin reduction 2) liposuction to BL Lat axillary roll Left > Right flap 4) abdominal dog ear  - liposuction possible excision Will schedule at her convenience for ambulatory procedure

## 2024-02-12 ENCOUNTER — NON-APPOINTMENT (OUTPATIENT)
Age: 49
End: 2024-02-12

## 2024-02-22 ENCOUNTER — TRANSCRIPTION ENCOUNTER (OUTPATIENT)
Age: 49
End: 2024-02-22

## 2024-02-22 NOTE — ASU PATIENT PROFILE, ADULT - NSICDXPASTSURGICALHX_GEN_ALL_CORE_FT
PAST SURGICAL HISTORY:  Breast cyst, right      delivery delivered x3    H/O bilateral mastectomy     H/O: hysterectomy     History of varicose veins BILAT

## 2024-02-22 NOTE — ASU PATIENT PROFILE, ADULT - NSICDXPASTMEDICALHX_GEN_ALL_CORE_FT
PAST MEDICAL HISTORY:  Anemia     Breast cancer     Endometriosis fibroids    H/O carotid stenosis     History of loop recorder palpitations    HLD (hyperlipidemia)     HTN (hypertension)     Hypothyroidism     Migraines

## 2024-02-22 NOTE — ASU PATIENT PROFILE, ADULT - NS PREOP UNDERSTANDS INFO
Spoke to patient to be  NPO/No solid foods after  2200 pm tonight,  allow to drink water or apple juice till 5-6 am ,  dress comfortable,  leave all valuable at home, no jewelry , no lotions,  Bring ID photo  and insurance cards,  escort arranged with family to go home, address and telephone given to  patient , spoke in Estonian/yes Spoke to patient to be  NPO/No solid food s after  12MN  tonight,  allow to drink water or apple juice till 5-6 am ,  dress comfortable,  leave all valuable at home, no jewelry , no lotions,  Bring ID photo  and insurance cards,  escort arranged with family to go home, address and telephone given to  patient , spoke in Canadian/yes

## 2024-02-23 ENCOUNTER — OUTPATIENT (OUTPATIENT)
Dept: OUTPATIENT SERVICES | Facility: HOSPITAL | Age: 49
LOS: 1 days | Discharge: HOME | End: 2024-02-23
Payer: COMMERCIAL

## 2024-02-23 ENCOUNTER — RESULT REVIEW (OUTPATIENT)
Age: 49
End: 2024-02-23

## 2024-02-23 ENCOUNTER — TRANSCRIPTION ENCOUNTER (OUTPATIENT)
Age: 49
End: 2024-02-23

## 2024-02-23 VITALS
HEIGHT: 61 IN | SYSTOLIC BLOOD PRESSURE: 123 MMHG | HEART RATE: 82 BPM | RESPIRATION RATE: 16 BRPM | WEIGHT: 165.35 LBS | DIASTOLIC BLOOD PRESSURE: 72 MMHG | OXYGEN SATURATION: 98 % | TEMPERATURE: 97 F

## 2024-02-23 VITALS
DIASTOLIC BLOOD PRESSURE: 63 MMHG | TEMPERATURE: 99 F | RESPIRATION RATE: 16 BRPM | HEART RATE: 98 BPM | OXYGEN SATURATION: 99 % | SYSTOLIC BLOOD PRESSURE: 121 MMHG

## 2024-02-23 DIAGNOSIS — N60.01 SOLITARY CYST OF RIGHT BREAST: Chronic | ICD-10-CM

## 2024-02-23 DIAGNOSIS — Z86.79 PERSONAL HISTORY OF OTHER DISEASES OF THE CIRCULATORY SYSTEM: Chronic | ICD-10-CM

## 2024-02-23 DIAGNOSIS — Z90.13 ACQUIRED ABSENCE OF BILATERAL BREASTS AND NIPPLES: Chronic | ICD-10-CM

## 2024-02-23 DIAGNOSIS — Z90.710 ACQUIRED ABSENCE OF BOTH CERVIX AND UTERUS: Chronic | ICD-10-CM

## 2024-02-23 PROCEDURE — 19380 REVJ RECONSTRUCTED BREAST: CPT | Mod: 50

## 2024-02-23 PROCEDURE — 19350 NIPPLE/AREOLA RECONSTRUCTION: CPT | Mod: 50

## 2024-02-23 PROCEDURE — 15877 SUCTION LIPECTOMY TRUNK: CPT | Mod: 59

## 2024-02-23 PROCEDURE — 88300 SURGICAL PATH GROSS: CPT | Mod: 26

## 2024-02-23 RX ORDER — FENTANYL CITRATE 50 UG/ML
50 INJECTION INTRAVENOUS
Refills: 0 | Status: DISCONTINUED | OUTPATIENT
Start: 2024-02-23 | End: 2024-02-23

## 2024-02-23 RX ORDER — DIAZEPAM 5 MG
2.5 TABLET ORAL
Refills: 0 | Status: DISCONTINUED | OUTPATIENT
Start: 2024-02-23 | End: 2024-02-23

## 2024-02-23 RX ORDER — SODIUM CHLORIDE 9 MG/ML
500 INJECTION, SOLUTION INTRAVENOUS
Refills: 0 | Status: DISCONTINUED | OUTPATIENT
Start: 2024-02-23 | End: 2024-02-23

## 2024-02-23 RX ORDER — APREPITANT 80 MG/1
40 CAPSULE ORAL ONCE
Refills: 0 | Status: COMPLETED | OUTPATIENT
Start: 2024-02-23 | End: 2024-02-23

## 2024-02-23 RX ORDER — ACETAMINOPHEN 500 MG
1000 TABLET ORAL ONCE
Refills: 0 | Status: DISCONTINUED | OUTPATIENT
Start: 2024-02-23 | End: 2024-02-23

## 2024-02-23 RX ORDER — SODIUM CHLORIDE 9 MG/ML
1000 INJECTION, SOLUTION INTRAVENOUS ONCE
Refills: 0 | Status: COMPLETED | OUTPATIENT
Start: 2024-02-23 | End: 2024-02-23

## 2024-02-23 RX ORDER — ACETAMINOPHEN 500 MG
1000 TABLET ORAL ONCE
Refills: 0 | Status: COMPLETED | OUTPATIENT
Start: 2024-02-23 | End: 2024-02-23

## 2024-02-23 RX ORDER — HYDROMORPHONE HYDROCHLORIDE 2 MG/ML
0.5 INJECTION INTRAMUSCULAR; INTRAVENOUS; SUBCUTANEOUS
Refills: 0 | Status: DISCONTINUED | OUTPATIENT
Start: 2024-02-23 | End: 2024-02-23

## 2024-02-23 RX ORDER — NERATINIB 40 MG/1
4 TABLET ORAL
Refills: 0 | DISCHARGE

## 2024-02-23 RX ADMIN — SODIUM CHLORIDE 1000 MILLILITER(S): 9 INJECTION, SOLUTION INTRAVENOUS at 19:59

## 2024-02-23 RX ADMIN — Medication 2.5 MILLIGRAM(S): at 19:05

## 2024-02-23 RX ADMIN — APREPITANT 40 MILLIGRAM(S): 80 CAPSULE ORAL at 12:12

## 2024-02-23 RX ADMIN — Medication 1000 MILLIGRAM(S): at 12:13

## 2024-02-23 RX ADMIN — Medication 2.5 MILLIGRAM(S): at 18:24

## 2024-02-23 NOTE — PRE-ANESTHESIA EVALUATION ADULT - NSANTHOSAYNRD_GEN_A_CORE
No. SILVA screening performed.  STOP BANG Legend: 0-2 = LOW Risk; 3-4 = INTERMEDIATE Risk; 5-8 = HIGH Risk
No. SILVA screening performed.  STOP BANG Legend: 0-2 = LOW Risk; 3-4 = INTERMEDIATE Risk; 5-8 = HIGH Risk

## 2024-02-23 NOTE — ASU PREOP CHECKLIST - SIDE RAILS UP
Problem: Patient Care Overview  Goal: Plan of Care Review  Flowsheets (Taken 3/23/2020 8834)  Progress: no change  Plan of Care Reviewed With: patient;spouse  Outcome Summary: PT eval completed: Pt c/o feeling disoriented, unsteady and weak.  PLOF, indep all ADLs.  Currently presenting with coordination deficits, slow processing for sequencing and commands, and ataxic gait.  LLE weaker than RLE.  Pt ambulates with min-A, HHA and ataxic gait, 100 ft.  Pt does not realize how unsteady he is and needs encouragement to allow assist and to decrease speed to increase safety.  Pt would benefit from inpatient rehab to address speech, OT, PT needs.        n/a

## 2024-02-23 NOTE — ASU DISCHARGE PLAN (ADULT/PEDIATRIC) - NS MD DC FALL RISK RISK
For information on Fall & Injury Prevention, visit: [email or web address] OR  [email or web address] OR  https://www.cdc.gov/steadi/patient.html

## 2024-02-23 NOTE — PRE-ANESTHESIA EVALUATION ADULT - NSANTHADDINFOFT_GEN_ALL_CORE
Pt accepts all anesthesia risks .IBNLT: Dental, Pharyngeal, Laryngeal, Tracheal Trauma, Sore Throat, Hoarseness, Recurrent Laryngeal Nerve Dysfunction, Upper and Lower Extremity Paresthesias, Nausea and Vomiting, Potential exacerbation of asthma and SILVA.

## 2024-02-23 NOTE — PRE-ANESTHESIA EVALUATION ADULT - NSPROPOSEDPROCEDFT_GEN_ALL_CORE
Bilateral Revision breast Reconstruction
Bilateral breast reconstruction, YOSHI flap optimization with mastopexy, bilateral nipple reconstruction

## 2024-02-27 ENCOUNTER — APPOINTMENT (OUTPATIENT)
Dept: PLASTIC SURGERY | Facility: CLINIC | Age: 49
End: 2024-02-27
Payer: MEDICAID

## 2024-02-27 PROBLEM — C50.919 MALIGNANT NEOPLASM OF UNSPECIFIED SITE OF UNSPECIFIED FEMALE BREAST: Chronic | Status: ACTIVE | Noted: 2024-02-22

## 2024-02-27 LAB — SURGICAL PATHOLOGY STUDY: SIGNIFICANT CHANGE UP

## 2024-02-27 PROCEDURE — 99024 POSTOP FOLLOW-UP VISIT: CPT

## 2024-02-27 NOTE — PHYSICAL EXAM
[de-identified] : Incisions c/d/i, no collections or s/sx of infection, bilateral reconstructed nipples warm, viable, sutures in place [de-identified] : port site sutures in place

## 2024-02-27 NOTE — HISTORY OF PRESENT ILLNESS
[FreeTextEntry1] : 50 y/o female presents 4 days s/p bilateral revision breast reconstruction YOSHI flap optimization with mastopexy, bilateral nipple reconstruction liposuctions of the axilla roll, hips and abdomen donor site, possible dog ear, and mediport removal. Denies any f/c/n/v. Patient is taking Tylenol and oxycodone for pain.

## 2024-02-27 NOTE — ASSESSMENT
[FreeTextEntry1] : Aquaphor/fluffy gauze to b/l nipples  Sports bra/ Spanx/ compression / lymphatic drainage therapy RTC Monday for suture removal with NP

## 2024-02-27 NOTE — SURGICAL HISTORY
[de-identified] : 9/14/2023: bilateral delayed YOSHI flap reconstruction, removal of breast implants [de-identified] : 02/23/2024: bilateral revision breast reconstruction YOSHI flap optimization with mastopexy, bilateral nipple reconstruction liposuctions of the axilla roll, hips and abdomen donor site, possible dog ear, and mediport removal

## 2024-03-04 ENCOUNTER — APPOINTMENT (OUTPATIENT)
Dept: PLASTIC SURGERY | Facility: CLINIC | Age: 49
End: 2024-03-04
Payer: COMMERCIAL

## 2024-03-04 PROCEDURE — 99024 POSTOP FOLLOW-UP VISIT: CPT

## 2024-03-04 RX ORDER — OXYCODONE 5 MG/1
5 TABLET ORAL
Qty: 12 | Refills: 0 | Status: DISCONTINUED | COMMUNITY
Start: 2024-02-16 | End: 2024-03-04

## 2024-03-04 NOTE — SURGICAL HISTORY
[de-identified] : 9/14/2023: bilateral delayed YOSHI flap reconstruction, removal of breast implants [de-identified] : 02/23/2024: bilateral revision breast reconstruction YOSHI flap optimization with mastopexy, bilateral nipple reconstruction liposuctions of the axilla roll, hips and abdomen donor site, possible dog ear, and mediport removal

## 2024-03-04 NOTE — HISTORY OF PRESENT ILLNESS
[FreeTextEntry1] : 48 y/o female presents 10 days s/p bilateral revision breast reconstruction YOSHI flap optimization with mastopexy, bilateral nipple reconstruction liposuctions of the axilla roll, hips and abdomen donor site, possible dog ear, and mediport removal. Denies any f/c/n/v. Patient is taking Tylenol and ibuprofen for pain. She presents today for suture removal.

## 2024-03-04 NOTE — PHYSICAL EXAM
[de-identified] : Incisions c/d/i, no collections or s/sx of infection, bilateral reconstructed nipples warm, viable, sutures removed  [de-identified] : port site sutures removed

## 2024-03-04 NOTE — ASSESSMENT
[FreeTextEntry1] : Aquaphor/fluffy gauze to b/l nipples x 1 week Silicone to incisions Reviewed pain management, will decrease tylenol/ibuprofen Sports bra/ Spanx/ compression / lymphatic drainage therapy RTC in 3 weeks for Dr. Lerman

## 2024-04-09 ENCOUNTER — APPOINTMENT (OUTPATIENT)
Dept: PLASTIC SURGERY | Facility: CLINIC | Age: 49
End: 2024-04-09
Payer: COMMERCIAL

## 2024-04-09 DIAGNOSIS — Z42.1 ENCOUNTER FOR BREAST RECONSTRUCTION FOLLOWING MASTECTOMY: ICD-10-CM

## 2024-04-09 DIAGNOSIS — N65.0 DEFORMITY OF RECONSTRUCTED BREAST: ICD-10-CM

## 2024-04-09 PROCEDURE — 99024 POSTOP FOLLOW-UP VISIT: CPT

## 2024-04-09 RX ORDER — GABAPENTIN 100 MG/1
100 CAPSULE ORAL 3 TIMES DAILY
Qty: 180 | Refills: 0 | Status: DISCONTINUED | COMMUNITY
Start: 2023-09-25 | End: 2024-04-09

## 2024-04-09 RX ORDER — IBUPROFEN 600 MG/1
600 TABLET, FILM COATED ORAL EVERY 6 HOURS
Qty: 24 | Refills: 0 | Status: DISCONTINUED | COMMUNITY
Start: 2023-09-06 | End: 2024-04-09

## 2024-04-15 PROBLEM — Z42.1 ENCOUNTER FOR BREAST RECONSTRUCTION FOLLOWING MASTECTOMY: Status: ACTIVE | Noted: 2023-03-21

## 2024-04-15 PROBLEM — Z42.1 ADMISSION FOR BREAST RECONSTRUCTION FOLLOWING MASTECTOMY: Status: ACTIVE | Noted: 2023-03-21

## 2024-04-15 PROBLEM — N65.0 BREAST RECONSTRUCTION DEFORMITY: Status: ACTIVE | Noted: 2024-02-16

## 2024-04-15 NOTE — PHYSICAL EXAM
[de-identified] : well healed no collections or s/sx of infection, bilateral reconstructed nipples warm, viable, [de-identified] : Soft nontender nondistended no collections no hernia no infection .  She has mild residual swelling at the dogear sites of the lateral flanks

## 2024-04-15 NOTE — HISTORY OF PRESENT ILLNESS
[FreeTextEntry1] : 48 y/o female s/p bilateral revision breast reconstruction YOSHI flap with mastopexy, bilateral nipple reconstruction liposuctions of the axilla roll, hips and abdomen donor site, possible dog ear, and mediport removal on 02/23/2024. Denies any f/c/n/v. Patient is not taking any pain medications. Patient c/o discomfort and pain both sides of her abdomen. Patient is going to start PT next week at [facility].

## 2024-04-15 NOTE — ASSESSMENT
[FreeTextEntry1] : Well healed Ok to tattoo in May/June 2024 - referred to [facility] Residual swelling to abdomen I expect this to improve in the next few months with more time I would not offer any revision surgery - advised weight loss. She is concerned about lipodystrophy of the upper back area and possible other areas where she wants more liposuction my recommendation is weight loss and avoiding additional potential asymmetry and deformity RTC in September 2024

## 2024-04-15 NOTE — SURGICAL HISTORY
[de-identified] : 9/14/2023: bilateral delayed YOSHI flap reconstruction, removal of breast implants [de-identified] : 02/23/2024: bilateral revision breast reconstruction YOSHI flap optimization with mastopexy, bilateral nipple reconstruction liposuctions of the axilla roll, hips and abdomen donor site, possible dog ear, and mediport removal

## 2024-08-23 ENCOUNTER — APPOINTMENT (OUTPATIENT)
Dept: ORTHOPEDIC SURGERY | Facility: CLINIC | Age: 49
End: 2024-08-23
Payer: MEDICAID

## 2024-08-23 DIAGNOSIS — R22.30 LOCALIZED SWELLING, MASS AND LUMP, UNSPECIFIED UPPER LIMB: ICD-10-CM

## 2024-08-23 DIAGNOSIS — M79.645 PAIN IN LEFT FINGER(S): ICD-10-CM

## 2024-08-23 PROCEDURE — 99204 OFFICE O/P NEW MOD 45 MIN: CPT | Mod: 25

## 2024-08-23 PROCEDURE — 73130 X-RAY EXAM OF HAND: CPT | Mod: LT

## 2024-08-23 NOTE — ASSESSMENT
[FreeTextEntry1] : My impression is that the patient most likely has a stable left index finger MCP collateral ligament and ulnar sagittal band sprain secondary to overuse. I advised the patient that X-Ray imaging obtained today shows no evidence of acute fracture or dislocation. I also advised the patient that physical exam does not show any instability of the left index finger. I therefore recommended having a custom splint made to allow for support of the left index finger. I advised the patient that she can also valeria tape the left index finger to the long finger for added support. I recommended she use the splint and Coban for a dedicated 6-week trial. I recommended that she follow up with me in she notes no improvement of her symptoms. She was in accordance right the plan. A script for OT was given to the patient today.

## 2024-08-23 NOTE — HISTORY OF PRESENT ILLNESS
[FreeTextEntry1] : The patient is presenting for follow up in regard to her left index finger discomfort and soft tissue mass over the dorsal aspect of the index finger which she states has not gotten any bigger since the last visit. The patient is constantly using her left index finger for her normal activities and feels most of her discomfort with pulling and pushing motions.   I recommended an initial attempt at conservative treatment with carpal tunnel night splint wear, which has significantly helped her symptoms.   An ultrasound over the dorsum of the left index MCP joint was obtained on July 27, 2023, findings were supportive of a retinacular/ganglion cyst over the dorsal aspect of the MCP joint. An EMG/NCS was done with Dr. Harper Doherty last year, she does not have the report with her today.   The patient did not show up to her previously scheduled August 11, 2023 visit.

## 2024-08-23 NOTE — PHYSICAL EXAM
[de-identified] : Physical exam demonstrates the patient to be alert and oriented x 3 and capable of ambulation. The patient is well-developed and well-nourished in no apparent respiratory distress. The majority of the skin is intact bilaterally in the upper extremities without any bilateral elbow lymphadenopathy.  Full, symmetric digital range of motion. There is a 1 x 2 mm soft tissue mass over the dorsal aspect of the left index finger MCP joint, directly over the index EDC tendon which is mobile. The soft tissue mass is nontender to palpation. No overlying redness or skin changes. There is tenderness to palpation over the ulnar aspect of the left index finger MCP joint, at the ulnar sagittal band and collateral ligament. Nontender over the radial aspect of the MCP joint. No discomfort with flexion of the left index finger along with radial and ulnar deviation. No collateral ligament instability.     [de-identified] : PA, lateral and oblique X-Rays of the left hand were obtained to assess for bony injury, masses or lesions. There is no evidence of acute fracture or dislocation. Concentric MCP joint of the left index finger.

## 2024-09-04 ENCOUNTER — APPOINTMENT (OUTPATIENT)
Dept: PLASTIC SURGERY | Facility: CLINIC | Age: 49
End: 2024-09-04

## 2024-09-04 DIAGNOSIS — N65.0 DEFORMITY OF RECONSTRUCTED BREAST: ICD-10-CM

## 2024-09-04 PROCEDURE — 99213 OFFICE O/P EST LOW 20 MIN: CPT

## 2024-09-04 RX ORDER — GINGER ROOT/GINGER ROOT EXT 262.5 MG
CAPSULE ORAL
Refills: 0 | Status: ACTIVE | COMMUNITY

## 2024-09-04 NOTE — HISTORY OF PRESENT ILLNESS
[FreeTextEntry1] : 48 y/o female s/p bilateral revision breast reconstruction YOSHI flap with mastopexy, bilateral nipple reconstruction liposuctions of the axilla roll, hips and abdomen donor site, possible dog ear, and mediport removal on 02/23/2024. Denies any f/c/n/v. Patient is not taking any pain medications. Patient had a right breast biopsy in 06/2024, which showed fat necrosis. Patient is having pain in her right breast and abdomen. Patient finished PT a month ago, she states the physical therapist informed her she should start OT. Patient had nipples tattooed in 05/2024 at [facility].

## 2024-09-04 NOTE — SURGICAL HISTORY
[de-identified] : 9/14/2023: bilateral delayed YOSHI flap reconstruction, removal of breast implants [de-identified] : 02/23/2024: bilateral revision breast reconstruction YOSHI flap optimization with mastopexy, bilateral nipple reconstruction liposuctions of the axilla roll, hips and abdomen donor site, possible dog ear, and mediport removal

## 2024-09-04 NOTE — PHYSICAL EXAM
[de-identified] : well healed no collections or s/sx of infection, bilateral reconstructed nipples warm, viable, [de-identified] : Soft nontender nondistended no collections no hernia no infection .  She has mild residual swelling at the dog ear sites of the lateral flanks

## 2024-09-04 NOTE — ASSESSMENT
[FreeTextEntry1] : Well healed She is concerned about lipodystrophy of the upper back area and possible other areas where she wants more liposuction my recommendation is weight loss and avoiding additional potential asymmetry and deformity RTC  for b/l abdominal dog excision as in office procedure

## 2024-09-04 NOTE — PHYSICAL EXAM
[de-identified] : well healed no collections or s/sx of infection, bilateral reconstructed nipples warm, viable, [de-identified] : Soft nontender nondistended no collections no hernia no infection .  She has mild residual swelling at the dog ear sites of the lateral flanks

## 2024-09-04 NOTE — SURGICAL HISTORY
[de-identified] : 9/14/2023: bilateral delayed YOSHI flap reconstruction, removal of breast implants [de-identified] : 02/23/2024: bilateral revision breast reconstruction YOSHI flap optimization with mastopexy, bilateral nipple reconstruction liposuctions of the axilla roll, hips and abdomen donor site, possible dog ear, and mediport removal

## 2024-09-04 NOTE — REASON FOR VISIT
[Post Op: _________] : a [unfilled] post op visit [Follow-Up: _____] : a [unfilled] follow-up visit [FreeTextEntry1] : Dr. Onofre

## 2024-10-18 ENCOUNTER — APPOINTMENT (OUTPATIENT)
Dept: ORTHOPEDIC SURGERY | Facility: CLINIC | Age: 49
End: 2024-10-18

## 2025-01-28 ENCOUNTER — APPOINTMENT (OUTPATIENT)
Dept: PLASTIC SURGERY | Facility: CLINIC | Age: 50
End: 2025-01-28

## (undated) DEVICE — DRSG GAUZE FLUFF 1PLY 18X30"

## (undated) DEVICE — ELCTR BOVIE TIP BLADE INSULATED 2.75" EDGE

## (undated) DEVICE — BLADE SCALPEL SAFETY #15 WITH PLASTIC GREEN HANDLE

## (undated) DEVICE — CANISTER SPECIMEN CONVERTOR PLASTIC

## (undated) DEVICE — SYR ASEPTO

## (undated) DEVICE — DRSG BREAST BINDER PINK FLORAL MED

## (undated) DEVICE — SUT VICRYL 2-0 27" CT-1

## (undated) DEVICE — SUT PDS II PLUS 0 36" CT-1

## (undated) DEVICE — NDL HYPO SAFE 25G X 1.5" (ORANGE)

## (undated) DEVICE — SUT VICRYL 4-0 27" FS-1 UNDYED

## (undated) DEVICE — SUT QUILL PDO 2 36CM 48MM

## (undated) DEVICE — DRAPE LIGHT HANDLE COVER (BLUE)

## (undated) DEVICE — SUT MONOCRYL 3-0 27" SH

## (undated) DEVICE — SYS RESOLVE FAT PROCESSING 1 UNIT

## (undated) DEVICE — SUT ETHIBOND 0 30" CT-1 GREEN

## (undated) DEVICE — DRAPE TOWEL BLUE 17" X 24"

## (undated) DEVICE — GEL AQUSNC PACKET 20GR

## (undated) DEVICE — ELCTR BOVIE PENCIL SMOKE EVACUATION

## (undated) DEVICE — VISTASEAL DUAL APPLICATOR

## (undated) DEVICE — SURGIPHOR STERILE WOUND IRR POVIDONE IODINE

## (undated) DEVICE — DRAIN PENROSE .25" X 18" LATEX

## (undated) DEVICE — SUT SILK 3-0 30" SH

## (undated) DEVICE — PACK LIPECTOMY

## (undated) DEVICE — PREP CHLORAPREP HI-LITE ORANGE 26ML

## (undated) DEVICE — PACK BREAST RECONSTRUCTION

## (undated) DEVICE — DRSG STOCKINETTE TUBULAR COTTON 2PLY 6X60"

## (undated) DEVICE — DRAPE CAMERA VIDEO 7"X96"

## (undated) DEVICE — TONGUE DEPRESSOR

## (undated) DEVICE — GLV 8 PROTEXIS (WHITE)

## (undated) DEVICE — WARMING BLANKET LOWER ADULT

## (undated) DEVICE — Device

## (undated) DEVICE — SUT MONOCRYL 5-0 18" P-3 UNDYED

## (undated) DEVICE — DRSG GAUZE MOISTURIZER 0.5 OZ 4X8

## (undated) DEVICE — POSITIONER FOAM EGG CRATE ULNAR 2PCS (PINK)

## (undated) DEVICE — DRAPE NAVIGATOR COVER

## (undated) DEVICE — SUT NYLON 9-0 5" DRM5

## (undated) DEVICE — DRSG STERISTRIPS 0.5 X 4"

## (undated) DEVICE — ADAPTER LUER LOCK TO LUER LOCK

## (undated) DEVICE — SLV COMPRESSION KNEE MED

## (undated) DEVICE — NDL HYPO REGULAR BEVEL 25G X 1.5" (BLUE)

## (undated) DEVICE — PREP BETADINE SPONGE STICKS

## (undated) DEVICE — SUT VICRYL 2-0 27" PS-2 UNDYED

## (undated) DEVICE — DRSG ACE BANDAGE 6"

## (undated) DEVICE — DRAIN RESERVOIR FOR JACKSON PRATT 100CC CARDINAL

## (undated) DEVICE — SUT STRATAFIX SPIRAL MONOCRYL PLUS 3-0 30CM PS-2 UNDYED

## (undated) DEVICE — CLAMP MICROVASCULAR SINGLE  1-2MM

## (undated) DEVICE — DRSG PAD CAST FOAM RESTON 7-7/8"X11.75"

## (undated) DEVICE — SUT MONOCRYL 4-0 18" PS-2

## (undated) DEVICE — SUT ETHILON 3-0 18" FS-1

## (undated) DEVICE — SYR LUER LOK 5CC

## (undated) DEVICE — DRAPE PROBE COVER 5" X 96"

## (undated) DEVICE — SYR CONTROL LUER LOK 10CC

## (undated) DEVICE — SYR LUER LOK 30CC

## (undated) DEVICE — ONETRAC LIGHTED RETRACTOR 135 X 30MM DISP

## (undated) DEVICE — GLV 7.5 PROTEXIS (WHITE)

## (undated) DEVICE — WARMING BLANKET FULL UNDERBODY

## (undated) DEVICE — SUT SILK 2-0 12-18"

## (undated) DEVICE — DRSG DERMABOND 0.7ML

## (undated) DEVICE — DRSG COMBINE 5X9"

## (undated) DEVICE — INVUITY ILLUMINATOR EIGR WAVEGUIDE, WIDE/FLAT DISP

## (undated) DEVICE — SOL ANTI FOG

## (undated) DEVICE — SUT PDS II 0 27" CT-1

## (undated) DEVICE — MEDTRONIC RADIALUX LIGHTED RETRACTOR DISP

## (undated) DEVICE — DRSG PREVENA PEEL & PLACE KIT 20CM

## (undated) DEVICE — BLADE SURGICAL #10 CARBON

## (undated) DEVICE — PACK UPPER BODY

## (undated) DEVICE — SUT QUILL MONODERM 2-0 30CM 24MM

## (undated) DEVICE — NDL NERVE STIM 22GA X 2IN 30 DEG

## (undated) DEVICE — LAP PAD 12 X 12"

## (undated) DEVICE — SUT VICRYL 2-0 27" SH UNDYED

## (undated) DEVICE — SUT NYLON 8-0 5" DRM6

## (undated) DEVICE — DRAPE PROBE COVER LATEX FREE 3X96"

## (undated) DEVICE — DRAPE TOP SHEET 53" X 101"

## (undated) DEVICE — SUT PROLENE 2-0 30" CT-2

## (undated) DEVICE — MARKING PEN W RULER

## (undated) DEVICE — NDL HYPO SAFE 25G X 5/8" (ORANGE)

## (undated) DEVICE — SUT MONOCRYL 4-0 18" P-3 UNDYED

## (undated) DEVICE — SUT MONOCRYL 3-0 18" PS-2 UNDYED

## (undated) DEVICE — DRSG KERLIX ROLL 4.5"

## (undated) DEVICE — SUT ETHILON 5-0 18" P-3 UNDYED

## (undated) DEVICE — ELCTR BOVIE TIP NEEDLE INSULATED 2.8" EDGE

## (undated) DEVICE — GLV 8 PROTEXIS (CREAM) MICRO

## (undated) DEVICE — SPONGE SURGICAL STRIP 1" X 6"

## (undated) DEVICE — DRSG SURGICAL BRA LG 38-40

## (undated) DEVICE — CANNULA ANT CHMBR 27GX22MM

## (undated) DEVICE — SUT ETHILON 2-0 18" PS-2

## (undated) DEVICE — LONE STAR ELASTIC STAY HOOK 12MM BLUNT

## (undated) DEVICE — DOPPLER PROBE  CABLE

## (undated) DEVICE — SYR LUER LOK 10CC

## (undated) DEVICE — SUT MONOCRYL 3-0 27" PS-2 UNDYED

## (undated) DEVICE — TUBING Y INFILTRATION

## (undated) DEVICE — BIPOLAR FORCEP KIRWAN JEWELERS STR 4" X 0.4MM W 12FT CORD (GREEN)

## (undated) DEVICE — SUT QUILL POLYPROPYLENE 2 24CM 36MM

## (undated) DEVICE — SUT PLAIN GUT FAST ABSORBING 5-0 PC-1

## (undated) DEVICE — DRAIN JACKSON PRATT 15FR ROUND END W TROCAR

## (undated) DEVICE — DRAPE C ARM 41X74"

## (undated) DEVICE — VENODYNE/SCD SLEEVE CALF MEDIUM

## (undated) DEVICE — BLADE PHOTON 7.5IN / 10.5IN

## (undated) DEVICE — SUT VICRYL 3-0 18" PS-2 UNDYED

## (undated) DEVICE — ELCTR BOVIE TIP BLADE INSULATED 2.8" EDGE WITH SAFETY

## (undated) DEVICE — SUT SILK 3-0 30" TIES

## (undated) DEVICE — SUT QUILL MONODERM 3-0 30CM 19MM

## (undated) DEVICE — DRAPE MAYO STAND 30"

## (undated) DEVICE — DRSG STERISTRIPS 1 X 5"

## (undated) DEVICE — SUT SILK 4-0 18" PS-2

## (undated) DEVICE — PACK BREAST

## (undated) DEVICE — SUT ETHILON 5-0 18" P-3

## (undated) DEVICE — ELCTR BOVIE TIP BLADE MEGADYNE E-Z CLEAN 4" EXTENDED

## (undated) DEVICE — CLAMP DBL VENOUS SM 20G/MM2

## (undated) DEVICE — DRSG TELFA 3 X 8

## (undated) DEVICE — SUT CHROMIC 4-0 27" RB-1

## (undated) DEVICE — SUT MONOCRYL 3-0 18" PS-1

## (undated) DEVICE — SUT PROLENE 4-0 18" P-3

## (undated) DEVICE — GLV 7 PROTEXIS (WHITE)

## (undated) DEVICE — ABDOMINAL BINDER MED/LG 9" X 36"-64"

## (undated) DEVICE — FRAZIER SUCTION TIP 10FR

## (undated) DEVICE — SUCTION YANKAUER BULBOUS TIP W VENT

## (undated) DEVICE — GLV 6.5 PROTEXIS (WHITE)

## (undated) DEVICE — LAP PAD 18 X 18"

## (undated) DEVICE — MERCIAN VISABILITY BACKROUND GREEN

## (undated) DEVICE — SYR LUER LOK 20CC

## (undated) DEVICE — SUT STRATAFIX SPIRAL MONOCRYL PLUS 4-0 14CM PS-2 UNDYED

## (undated) DEVICE — ELCTR BOVIE PENCIL HANDPIECE ROCKER SWITCH 15FT

## (undated) DEVICE — DRAPE SPLIT SHEET 77" X 120"

## (undated) DEVICE — SUT PDS II 2-0 27" CT-2

## (undated) DEVICE — NDL HYPO SAFE 20G X 1.5" (YELLOW)

## (undated) DEVICE — DRAPE IOBAN 23" X 23"

## (undated) DEVICE — WARMING BLANKET UPPER ADULT

## (undated) DEVICE — DRAPE MEDIUM SHEET 44" X 70"

## (undated) DEVICE — SUT SILK 2-0 30" PSL

## (undated) DEVICE — DRAPE MAYO STAND 23"

## (undated) DEVICE — SPEAR SURG EYE WECK-CELL CELOS

## (undated) DEVICE — DRSG TEGADERM 6"X8"

## (undated) DEVICE — FOLEY TRAY 16FR 5CC LF UMETER CLOSED

## (undated) DEVICE — HEMOSTATIX SCALPEL BLADE #10 FOR PRECISION CONTROLLER

## (undated) DEVICE — SYR LUER LOK 1CC

## (undated) DEVICE — DRAPE SURGICAL #1010

## (undated) DEVICE — PACK ABDOMINOPLASTY

## (undated) DEVICE — SUT NYLON 9-0 5" HSV6

## (undated) DEVICE — SUT VICRYL 2-0 27" SH

## (undated) DEVICE — STAPLER SKIN PROXIMATE

## (undated) DEVICE — DRSG AQUACEL 3.5 X 10"

## (undated) DEVICE — SUT VICRYL 2-0 27" CT-2 UNDYED

## (undated) DEVICE — SUT QUILL MONODERM 2-0 30CM 19MM

## (undated) DEVICE — TUBING BLUE CAP M/F

## (undated) DEVICE — DRSG DERMABOND PRINEO 60CM

## (undated) DEVICE — STAPLER SKIN VISI-STAT 35 WIDE

## (undated) DEVICE — SUT MONOCRYL 4-0 27" PS-2 UNDYED

## (undated) DEVICE — DRAPE SPLIT SHEET 77" X 108"

## (undated) DEVICE — BLADE SCALPEL SAFETY #11 WITH PLASTIC GREEN HANDLE

## (undated) DEVICE — DRSG TEGADERM 2.5X3"

## (undated) DEVICE — SUT VICRYL 3-0 27" PS-2 UNDYED

## (undated) DEVICE — BLADE SURGICAL #15 CARBON

## (undated) DEVICE — SUT PROLENE 4-0 18" PS-2

## (undated) DEVICE — SUT VICRYL 2-0 27" CT-1 UNDYED

## (undated) DEVICE — SYR LUER LOK 3CC

## (undated) DEVICE — SUT VICRYL 4-0 27" SH

## (undated) DEVICE — CATARACT KIT